# Patient Record
Sex: MALE | Race: WHITE | NOT HISPANIC OR LATINO | Employment: OTHER | URBAN - NONMETROPOLITAN AREA
[De-identification: names, ages, dates, MRNs, and addresses within clinical notes are randomized per-mention and may not be internally consistent; named-entity substitution may affect disease eponyms.]

---

## 2021-10-02 ENCOUNTER — HOSPITAL ENCOUNTER (EMERGENCY)
Facility: HOSPITAL | Age: 76
Discharge: HOME | End: 2021-10-02
Attending: EMERGENCY MEDICINE | Admitting: EMERGENCY MEDICINE

## 2021-10-02 VITALS
SYSTOLIC BLOOD PRESSURE: 144 MMHG | RESPIRATION RATE: 18 BRPM | DIASTOLIC BLOOD PRESSURE: 89 MMHG | BODY MASS INDEX: 36.37 KG/M2 | OXYGEN SATURATION: 96 % | HEART RATE: 80 BPM | TEMPERATURE: 98.6 F | HEIGHT: 68 IN | WEIGHT: 240 LBS

## 2021-10-02 DIAGNOSIS — U07.1 COVID-19: Primary | ICD-10-CM

## 2021-10-02 LAB
ALBUMIN SERPL-MCNC: 3.61 G/DL (ref 3.5–5.2)
ALBUMIN/GLOB SERPL: 1.1 G/DL
ALP SERPL-CCNC: 55 U/L (ref 39–117)
ALT SERPL W P-5'-P-CCNC: 22 U/L (ref 1–41)
ANION GAP SERPL CALCULATED.3IONS-SCNC: 10.6 MMOL/L (ref 5–15)
AST SERPL-CCNC: 31 U/L (ref 1–40)
BACTERIA UR QL AUTO: ABNORMAL /HPF
BASOPHILS # BLD AUTO: 0.01 10*3/MM3 (ref 0–0.2)
BASOPHILS NFR BLD AUTO: 0.3 % (ref 0–1.5)
BILIRUB SERPL-MCNC: 0.4 MG/DL (ref 0–1.2)
BILIRUB UR QL STRIP: NEGATIVE
BUN SERPL-MCNC: 16 MG/DL (ref 8–23)
BUN/CREAT SERPL: 11.2 (ref 7–25)
CALCIUM SPEC-SCNC: 8.1 MG/DL (ref 8.6–10.5)
CHLORIDE SERPL-SCNC: 101 MMOL/L (ref 98–107)
CLARITY UR: CLEAR
CO2 SERPL-SCNC: 22.4 MMOL/L (ref 22–29)
COLOR UR: ABNORMAL
CREAT SERPL-MCNC: 1.43 MG/DL (ref 0.76–1.27)
DEPRECATED RDW RBC AUTO: 43.5 FL (ref 37–54)
EOSINOPHIL # BLD AUTO: 0.04 10*3/MM3 (ref 0–0.4)
EOSINOPHIL NFR BLD AUTO: 1.1 % (ref 0.3–6.2)
ERYTHROCYTE [DISTWIDTH] IN BLOOD BY AUTOMATED COUNT: 12.8 % (ref 12.3–15.4)
FLUAV RNA RESP QL NAA+PROBE: NOT DETECTED
FLUBV RNA RESP QL NAA+PROBE: NOT DETECTED
GFR SERPL CREATININE-BSD FRML MDRD: 48 ML/MIN/1.73
GLOBULIN UR ELPH-MCNC: 3.4 GM/DL
GLUCOSE SERPL-MCNC: 90 MG/DL (ref 65–99)
GLUCOSE UR STRIP-MCNC: NEGATIVE MG/DL
HCT VFR BLD AUTO: 43.1 % (ref 37.5–51)
HGB BLD-MCNC: 14.1 G/DL (ref 13–17.7)
HGB UR QL STRIP.AUTO: NEGATIVE
HOLD SPECIMEN: NORMAL
HOLD SPECIMEN: NORMAL
HYALINE CASTS UR QL AUTO: ABNORMAL /LPF
IMM GRANULOCYTES # BLD AUTO: 0.01 10*3/MM3 (ref 0–0.05)
IMM GRANULOCYTES NFR BLD AUTO: 0.3 % (ref 0–0.5)
KETONES UR QL STRIP: ABNORMAL
LEUKOCYTE ESTERASE UR QL STRIP.AUTO: NEGATIVE
LYMPHOCYTES # BLD AUTO: 0.73 10*3/MM3 (ref 0.7–3.1)
LYMPHOCYTES NFR BLD AUTO: 20.3 % (ref 19.6–45.3)
MCH RBC QN AUTO: 30.1 PG (ref 26.6–33)
MCHC RBC AUTO-ENTMCNC: 32.7 G/DL (ref 31.5–35.7)
MCV RBC AUTO: 92.1 FL (ref 79–97)
MONOCYTES # BLD AUTO: 0.56 10*3/MM3 (ref 0.1–0.9)
MONOCYTES NFR BLD AUTO: 15.6 % (ref 5–12)
NEUTROPHILS NFR BLD AUTO: 2.25 10*3/MM3 (ref 1.7–7)
NEUTROPHILS NFR BLD AUTO: 62.4 % (ref 42.7–76)
NITRITE UR QL STRIP: NEGATIVE
NRBC BLD AUTO-RTO: 0 /100 WBC (ref 0–0.2)
PH UR STRIP.AUTO: <=5 [PH] (ref 5–8)
PLATELET # BLD AUTO: 96 10*3/MM3 (ref 140–450)
PMV BLD AUTO: 11.5 FL (ref 6–12)
POTASSIUM SERPL-SCNC: 4.1 MMOL/L (ref 3.5–5.2)
PROT SERPL-MCNC: 7 G/DL (ref 6–8.5)
PROT UR QL STRIP: ABNORMAL
RBC # BLD AUTO: 4.68 10*6/MM3 (ref 4.14–5.8)
RBC # UR: ABNORMAL /HPF
REF LAB TEST METHOD: ABNORMAL
SARS-COV-2 RNA RESP QL NAA+PROBE: DETECTED
SODIUM SERPL-SCNC: 134 MMOL/L (ref 136–145)
SP GR UR STRIP: >=1.03 (ref 1–1.03)
SQUAMOUS #/AREA URNS HPF: ABNORMAL /HPF
UROBILINOGEN UR QL STRIP: ABNORMAL
WBC # BLD AUTO: 3.6 10*3/MM3 (ref 3.4–10.8)
WBC UR QL AUTO: ABNORMAL /HPF
WHOLE BLOOD HOLD SPECIMEN: NORMAL
WHOLE BLOOD HOLD SPECIMEN: NORMAL

## 2021-10-02 PROCEDURE — 99284 EMERGENCY DEPT VISIT MOD MDM: CPT

## 2021-10-02 PROCEDURE — 81001 URINALYSIS AUTO W/SCOPE: CPT | Performed by: PHYSICIAN ASSISTANT

## 2021-10-02 PROCEDURE — M0243 CASIRIVI AND IMDEVI INFUSION: HCPCS

## 2021-10-02 PROCEDURE — 80053 COMPREHEN METABOLIC PANEL: CPT | Performed by: PHYSICIAN ASSISTANT

## 2021-10-02 PROCEDURE — 85025 COMPLETE CBC W/AUTO DIFF WBC: CPT | Performed by: PHYSICIAN ASSISTANT

## 2021-10-02 PROCEDURE — 87636 SARSCOV2 & INF A&B AMP PRB: CPT | Performed by: PHYSICIAN ASSISTANT

## 2021-10-02 PROCEDURE — C9803 HOPD COVID-19 SPEC COLLECT: HCPCS

## 2021-10-02 RX ORDER — METHYLPREDNISOLONE SODIUM SUCCINATE 125 MG/2ML
125 INJECTION, POWDER, LYOPHILIZED, FOR SOLUTION INTRAMUSCULAR; INTRAVENOUS ONCE AS NEEDED
Status: DISCONTINUED | OUTPATIENT
Start: 2021-10-02 | End: 2021-10-02 | Stop reason: HOSPADM

## 2021-10-02 RX ORDER — DIPHENHYDRAMINE HYDROCHLORIDE 50 MG/ML
50 INJECTION INTRAMUSCULAR; INTRAVENOUS ONCE AS NEEDED
Status: DISCONTINUED | OUTPATIENT
Start: 2021-10-02 | End: 2021-10-02 | Stop reason: HOSPADM

## 2021-10-02 RX ORDER — EPINEPHRINE 1 MG/ML
0.3 INJECTION, SOLUTION INTRAMUSCULAR; SUBCUTANEOUS ONCE AS NEEDED
Status: DISCONTINUED | OUTPATIENT
Start: 2021-10-02 | End: 2021-10-02 | Stop reason: HOSPADM

## 2021-10-02 RX ORDER — DIPHENHYDRAMINE HCL 50 MG
50 CAPSULE ORAL ONCE AS NEEDED
Status: DISCONTINUED | OUTPATIENT
Start: 2021-10-02 | End: 2021-10-02 | Stop reason: HOSPADM

## 2021-10-02 RX ORDER — SODIUM CHLORIDE 9 MG/ML
30 INJECTION, SOLUTION INTRAVENOUS ONCE
Status: COMPLETED | OUTPATIENT
Start: 2021-10-02 | End: 2021-10-02

## 2021-10-02 RX ORDER — SODIUM CHLORIDE 0.9 % (FLUSH) 0.9 %
10 SYRINGE (ML) INJECTION AS NEEDED
Status: DISCONTINUED | OUTPATIENT
Start: 2021-10-02 | End: 2021-10-02 | Stop reason: HOSPADM

## 2021-10-02 RX ADMIN — SODIUM CHLORIDE 30 ML: 9 INJECTION, SOLUTION INTRAVENOUS at 19:44

## 2021-10-02 RX ADMIN — SODIUM CHLORIDE 1000 ML: 9 INJECTION, SOLUTION INTRAVENOUS at 19:54

## 2021-10-02 RX ADMIN — CASIRIVIMAB AND IMDEVIMAB: 600; 600 INJECTION, SOLUTION, CONCENTRATE INTRAVENOUS at 19:23

## 2021-10-02 NOTE — ED PROVIDER NOTES
Subjective     History provided by:  Patient   used: No    URI  Presenting symptoms: congestion, cough, fatigue and rhinorrhea    Severity:  Mild  Onset quality:  Sudden  Duration:  5 days  Timing:  Constant  Progression:  Worsening  Chronicity:  New  Relieved by:  Nothing  Worsened by:  Nothing  Ineffective treatments:  None tried  Risk factors: sick contacts    Risk factors comment:  Wife and brother in law are covid +      Review of Systems   Constitutional: Positive for fatigue.   HENT: Positive for congestion and rhinorrhea.    Eyes: Negative.    Respiratory: Positive for cough.    Cardiovascular: Negative.    Gastrointestinal: Negative.    Endocrine: Negative.    Genitourinary: Negative.    Musculoskeletal: Negative.    Skin: Negative.    Allergic/Immunologic: Negative.    Neurological: Negative.    Hematological: Negative.    Psychiatric/Behavioral: Negative.    All other systems reviewed and are negative.      No past medical history on file.    Allergies   Allergen Reactions   • Ciprofibrate Hives   • Sulfa Antibiotics Hives       No past surgical history on file.    No family history on file.    Social History     Socioeconomic History   • Marital status:      Spouse name: Not on file   • Number of children: Not on file   • Years of education: Not on file   • Highest education level: Not on file           Objective   Physical Exam  Vitals and nursing note reviewed.   Constitutional:       Appearance: Normal appearance. He is normal weight.   HENT:      Head: Normocephalic and atraumatic.      Right Ear: External ear normal.      Left Ear: External ear normal.      Nose: Nose normal.      Mouth/Throat:      Mouth: Mucous membranes are moist.      Pharynx: Oropharynx is clear.   Eyes:      Extraocular Movements: Extraocular movements intact.      Conjunctiva/sclera: Conjunctivae normal.      Pupils: Pupils are equal, round, and reactive to light.   Cardiovascular:      Rate and  "Rhythm: Normal rate and regular rhythm.      Heart sounds: Normal heart sounds.   Pulmonary:      Effort: Pulmonary effort is normal.      Breath sounds: Normal breath sounds.   Abdominal:      General: Abdomen is flat. Bowel sounds are normal.      Palpations: Abdomen is soft.   Musculoskeletal:         General: Normal range of motion.      Cervical back: Normal range of motion and neck supple.   Skin:     General: Skin is warm and dry.      Capillary Refill: Capillary refill takes less than 2 seconds.   Neurological:      General: No focal deficit present.      Mental Status: He is alert and oriented to person, place, and time. Mental status is at baseline.   Psychiatric:         Mood and Affect: Mood normal.         Behavior: Behavior normal.         Judgment: Judgment normal.         Procedures           ED Course  ED Course as of Oct 02 2034   Sat Oct 02, 2021   1800 COVID19(!!): Detected [ML]   1838 The FDA has authorized the emergency use of REGN-COV2  which is not an FDA approved drug. Discussions with the pt regarding the risks and benefits of REGN-COV2 have occurred. The ptrecognizes that this is an investigational treatment which may offer significant known and potential benefits and risks, the extent of which are unknown. Information on available alternative treatments and the risks and benefits of those alternatives was discussed. The pt received the \"Fact Sheet for Patients Parents and Caregivers\". All questions from the pt were answered to satisfaction. The pt has the option to accept or refuse treatment with REGN-COV2 and would like to accept treatment.    Counseling regarding continued self isolation and use of infection control measures according to the CDC guidelines has occurred.      [ML]      ED Course User Index  [ML] Akua Martin PA                                           MDM  Number of Diagnoses or Management Options     Amount and/or Complexity of Data Reviewed  Clinical lab " tests: ordered and reviewed  Tests in the radiology section of CPT®: reviewed and ordered    Risk of Complications, Morbidity, and/or Mortality  Presenting problems: low  Diagnostic procedures: low  Management options: low        Final diagnoses:   COVID-19       ED Disposition  ED Disposition     ED Disposition Condition Comment    Discharge Stable           No follow-up provider specified.       Medication List      No changes were made to your prescriptions during this visit.          Akua Martin PA  10/02/21 1903       Akua Martin PA  10/02/21 2033

## 2021-10-02 NOTE — ED NOTES
MD has discussed The FDA has authorized the emergency use of REGN-COV2  , which is not an FDA approved drug. Discussions with the patient regarding the risks and benefits of  REGN-COV2  have occurred. The patient recognizes that this is an investigational treatment which may offer significant known and potential benefits and risks, the extent of which are unknown. Information on available alternative treatments and the risks and benefits of those alternatives was discussed. The patient received the “Fact Sheet for Patients Parents and Caregivers”. All questions from the patient were answered to satisfaction. The patient has the option to accept or refuse treatment with REGN-COV2 and would like to accept treatment.  Education handouts have been given to patient.    Counseling regarding continued self isolation and use of infection control measures according to the CDC guidelines has occurred.       Sumi Martinez RN  10/02/21 4408

## 2021-10-02 NOTE — DISCHARGE INSTRUCTIONS
Call one of the offices below to establish a primary care provider.  If you are unable to get an appointment and feel it is an emergency and need to be seen immediately please return to the Emergency Department.    Call one of the office below to set up a primary care provider.    Dr. Dimitris Andino                                                                                                       [address] [facility] [address]  [phone]    Dr. Jones, Dr. JAMMIE Sanford, Dr. ROSALES Sanford ([facility])  [address] [facility] [address]  [phone]    Dr. Stovall, Dr. Zamora, Dr. Nava ([facility])  [address] [facility] [address]  [phone]    Dr. Barker  [address] [facility] [address]  [phone]    Dr. Ward, Dr. Phillips, Dr. Fay, Dr. Andrew (Critical access hospital)  [address] [place] [address] [address]  [facility] [address]  [phone]    Dr. Anushka Lopez  [address] [facility] [place] [address]  [phone]    Dr. Ping Flor  [address]   [address]  [facility] [address]  [phone]    Dr. Andino  [address] [facility] [address]  [phone]    Dr. Mack, Dr. More  [address] [facility] [place] [address]  [phone]    Dr. Mohr  [address]                                                              [address]  [facility] [address]  [phone]    Dr. Patel  [address] [facility] [address]  [phone]    Dr. Georgia Polo  [address]  [address]  [place] [address]  [phone]    Dr. Zhao and [facility]   [address] [facility]  [address]  [place] [address]  [phone]

## 2021-10-03 ENCOUNTER — APPOINTMENT (OUTPATIENT)
Dept: GENERAL RADIOLOGY | Facility: HOSPITAL | Age: 76
End: 2021-10-03

## 2021-10-03 ENCOUNTER — HOSPITAL ENCOUNTER (INPATIENT)
Facility: HOSPITAL | Age: 76
LOS: 2 days | Discharge: HOME | End: 2021-10-05
Attending: STUDENT IN AN ORGANIZED HEALTH CARE EDUCATION/TRAINING PROGRAM | Admitting: HOSPITALIST

## 2021-10-03 ENCOUNTER — APPOINTMENT (OUTPATIENT)
Dept: CT IMAGING | Facility: HOSPITAL | Age: 76
End: 2021-10-03

## 2021-10-03 DIAGNOSIS — U07.1 COVID-19: Primary | ICD-10-CM

## 2021-10-03 PROBLEM — J12.82 PNEUMONIA DUE TO COVID-19 VIRUS: Status: ACTIVE | Noted: 2021-10-03

## 2021-10-03 LAB
A-A DO2: 44.1 MMHG (ref 0–300)
A-A DO2: 75.7 MMHG (ref 0–300)
ALBUMIN SERPL-MCNC: 3.87 G/DL (ref 3.5–5.2)
ALBUMIN/GLOB SERPL: 1.3 G/DL
ALP SERPL-CCNC: 53 U/L (ref 39–117)
ALT SERPL W P-5'-P-CCNC: 30 U/L (ref 1–41)
ANION GAP SERPL CALCULATED.3IONS-SCNC: 11 MMOL/L (ref 5–15)
APTT PPP: 31.1 SECONDS (ref 25.5–35.4)
ARTERIAL PATENCY WRIST A: ABNORMAL
ARTERIAL PATENCY WRIST A: POSITIVE
AST SERPL-CCNC: 35 U/L (ref 1–40)
ATMOSPHERIC PRESS: 727 MMHG
ATMOSPHERIC PRESS: 728 MMHG
BASE EXCESS BLDA CALC-SCNC: 1.7 MMOL/L (ref 0–2)
BASE EXCESS BLDA CALC-SCNC: 2.4 MMOL/L (ref 0–2)
BASOPHILS # BLD AUTO: 0 10*3/MM3 (ref 0–0.2)
BASOPHILS # BLD AUTO: 0.01 10*3/MM3 (ref 0–0.2)
BASOPHILS NFR BLD AUTO: 0 % (ref 0–1.5)
BASOPHILS NFR BLD AUTO: 0.2 % (ref 0–1.5)
BDY SITE: ABNORMAL
BDY SITE: ABNORMAL
BILIRUB SERPL-MCNC: 0.5 MG/DL (ref 0–1.2)
BODY TEMPERATURE: 0 C
BODY TEMPERATURE: 0 C
BUN SERPL-MCNC: 17 MG/DL (ref 8–23)
BUN/CREAT SERPL: 13.1 (ref 7–25)
CALCIUM SPEC-SCNC: 8.2 MG/DL (ref 8.6–10.5)
CHLORIDE SERPL-SCNC: 99 MMOL/L (ref 98–107)
CO2 BLDA-SCNC: 27.2 MMOL/L (ref 22–33)
CO2 BLDA-SCNC: 27.6 MMOL/L (ref 22–33)
CO2 SERPL-SCNC: 26 MMOL/L (ref 22–29)
COHGB MFR BLD: 0.8 % (ref 0–5)
COHGB MFR BLD: 1 % (ref 0–5)
CREAT SERPL-MCNC: 1.3 MG/DL (ref 0.76–1.27)
CRP SERPL-MCNC: 2.04 MG/DL (ref 0–0.5)
D DIMER PPP FEU-MCNC: 0.36 MCGFEU/ML (ref 0–0.5)
DEPRECATED RDW RBC AUTO: 42.8 FL (ref 37–54)
DEPRECATED RDW RBC AUTO: 43 FL (ref 37–54)
EOSINOPHIL # BLD AUTO: 0 10*3/MM3 (ref 0–0.4)
EOSINOPHIL # BLD AUTO: 0 10*3/MM3 (ref 0–0.4)
EOSINOPHIL NFR BLD AUTO: 0 % (ref 0.3–6.2)
EOSINOPHIL NFR BLD AUTO: 0 % (ref 0.3–6.2)
ERYTHROCYTE [DISTWIDTH] IN BLOOD BY AUTOMATED COUNT: 12.7 % (ref 12.3–15.4)
ERYTHROCYTE [DISTWIDTH] IN BLOOD BY AUTOMATED COUNT: 12.8 % (ref 12.3–15.4)
FERRITIN SERPL-MCNC: 399.5 NG/ML (ref 30–400)
FOLATE SERPL-MCNC: 17.3 NG/ML (ref 4.78–24.2)
GAS FLOW AIRWAY: 2 LPM
GFR SERPL CREATININE-BSD FRML MDRD: 54 ML/MIN/1.73
GLOBULIN UR ELPH-MCNC: 3 GM/DL
GLUCOSE BLDC GLUCOMTR-MCNC: 182 MG/DL (ref 70–130)
GLUCOSE SERPL-MCNC: 152 MG/DL (ref 65–99)
HAV IGM SERPL QL IA: NORMAL
HBA1C MFR BLD: 5.9 % (ref 4.8–5.6)
HBV CORE IGM SERPL QL IA: NORMAL
HBV SURFACE AG SERPL QL IA: NORMAL
HCO3 BLDA-SCNC: 26 MMOL/L (ref 20–26)
HCO3 BLDA-SCNC: 26.4 MMOL/L (ref 20–26)
HCT VFR BLD AUTO: 41.2 % (ref 37.5–51)
HCT VFR BLD AUTO: 42 % (ref 37.5–51)
HCT VFR BLD CALC: 40.6 % (ref 38–51)
HCT VFR BLD CALC: 42.8 % (ref 38–51)
HCV AB SER DONR QL: NORMAL
HGB BLD-MCNC: 13.6 G/DL (ref 13–17.7)
HGB BLD-MCNC: 13.9 G/DL (ref 13–17.7)
HGB BLDA-MCNC: 13.2 G/DL (ref 14–18)
HGB BLDA-MCNC: 14 G/DL (ref 14–18)
HIV1+2 AB SER QL: NORMAL
IMM GRANULOCYTES # BLD AUTO: 0.01 10*3/MM3 (ref 0–0.05)
IMM GRANULOCYTES # BLD AUTO: 0.01 10*3/MM3 (ref 0–0.05)
IMM GRANULOCYTES NFR BLD AUTO: 0.2 % (ref 0–0.5)
IMM GRANULOCYTES NFR BLD AUTO: 0.2 % (ref 0–0.5)
INHALED O2 CONCENTRATION: 21 %
INHALED O2 CONCENTRATION: 28 %
INR PPP: 1.03 (ref 0.9–1.1)
LARGE PLATELETS: NORMAL
LDH SERPL-CCNC: 216 U/L (ref 135–225)
LYMPHOCYTES # BLD AUTO: 0.45 10*3/MM3 (ref 0.7–3.1)
LYMPHOCYTES # BLD AUTO: 0.51 10*3/MM3 (ref 0.7–3.1)
LYMPHOCYTES NFR BLD AUTO: 9.1 % (ref 19.6–45.3)
LYMPHOCYTES NFR BLD AUTO: 9.5 % (ref 19.6–45.3)
Lab: ABNORMAL
Lab: ABNORMAL
MAGNESIUM SERPL-MCNC: 1.9 MG/DL (ref 1.6–2.4)
MCH RBC QN AUTO: 30.2 PG (ref 26.6–33)
MCH RBC QN AUTO: 30.3 PG (ref 26.6–33)
MCHC RBC AUTO-ENTMCNC: 33 G/DL (ref 31.5–35.7)
MCHC RBC AUTO-ENTMCNC: 33.1 G/DL (ref 31.5–35.7)
MCV RBC AUTO: 91.4 FL (ref 79–97)
MCV RBC AUTO: 91.7 FL (ref 79–97)
METHGB BLD QL: 0.2 % (ref 0–3)
METHGB BLD QL: 0.2 % (ref 0–3)
MODALITY: ABNORMAL
MODALITY: ABNORMAL
MONOCYTES # BLD AUTO: 0.16 10*3/MM3 (ref 0.1–0.9)
MONOCYTES # BLD AUTO: 0.59 10*3/MM3 (ref 0.1–0.9)
MONOCYTES NFR BLD AUTO: 11 % (ref 5–12)
MONOCYTES NFR BLD AUTO: 3.2 % (ref 5–12)
NEUTROPHILS NFR BLD AUTO: 4.25 10*3/MM3 (ref 1.7–7)
NEUTROPHILS NFR BLD AUTO: 4.34 10*3/MM3 (ref 1.7–7)
NEUTROPHILS NFR BLD AUTO: 79.1 % (ref 42.7–76)
NEUTROPHILS NFR BLD AUTO: 87.5 % (ref 42.7–76)
NOTE: ABNORMAL
NOTE: ABNORMAL
NOTIFIED BY: ABNORMAL
NOTIFIED WHO: ABNORMAL
NRBC BLD AUTO-RTO: 0 /100 WBC (ref 0–0.2)
NRBC BLD AUTO-RTO: 0 /100 WBC (ref 0–0.2)
OXYHGB MFR BLDV: 88.6 % (ref 94–99)
OXYHGB MFR BLDV: 94.5 % (ref 94–99)
PCO2 BLDA: 37.8 MM HG (ref 35–45)
PCO2 BLDA: 38.9 MM HG (ref 35–45)
PCO2 TEMP ADJ BLD: ABNORMAL MM[HG]
PCO2 TEMP ADJ BLD: ABNORMAL MM[HG]
PH BLDA: 7.43 PH UNITS (ref 7.35–7.45)
PH BLDA: 7.45 PH UNITS (ref 7.35–7.45)
PH, TEMP CORRECTED: ABNORMAL
PH, TEMP CORRECTED: ABNORMAL
PLATELET # BLD AUTO: 84 10*3/MM3 (ref 140–450)
PLATELET # BLD AUTO: 88 10*3/MM3 (ref 140–450)
PMV BLD AUTO: 11 FL (ref 6–12)
PMV BLD AUTO: 12.5 FL (ref 6–12)
PO2 BLDA: 55 MM HG (ref 83–108)
PO2 BLDA: 72.5 MM HG (ref 83–108)
PO2 TEMP ADJ BLD: ABNORMAL MM[HG]
PO2 TEMP ADJ BLD: ABNORMAL MM[HG]
POTASSIUM SERPL-SCNC: 3.9 MMOL/L (ref 3.5–5.2)
PROCALCITONIN SERPL-MCNC: 0.13 NG/ML (ref 0–0.25)
PROT SERPL-MCNC: 6.9 G/DL (ref 6–8.5)
PROTHROMBIN TIME: 13.9 SECONDS (ref 12.8–14.5)
QT INTERVAL: 338 MS
QTC INTERVAL: 431 MS
RBC # BLD AUTO: 4.51 10*6/MM3 (ref 4.14–5.8)
RBC # BLD AUTO: 4.58 10*6/MM3 (ref 4.14–5.8)
RBC MORPH BLD: NORMAL
SAO2 % BLDCOA: 89.7 % (ref 94–99)
SAO2 % BLDCOA: 95.5 % (ref 94–99)
SMALL PLATELETS BLD QL SMEAR: NORMAL
SODIUM SERPL-SCNC: 136 MMOL/L (ref 136–145)
TSH SERPL DL<=0.05 MIU/L-ACNC: 0.52 UIU/ML (ref 0.27–4.2)
VENTILATOR MODE: ABNORMAL
VENTILATOR MODE: ABNORMAL
VIT B12 BLD-MCNC: 452 PG/ML (ref 211–946)
WBC # BLD AUTO: 4.96 10*3/MM3 (ref 3.4–10.8)
WBC # BLD AUTO: 5.37 10*3/MM3 (ref 3.4–10.8)

## 2021-10-03 PROCEDURE — 85610 PROTHROMBIN TIME: CPT | Performed by: STUDENT IN AN ORGANIZED HEALTH CARE EDUCATION/TRAINING PROGRAM

## 2021-10-03 PROCEDURE — 83615 LACTATE (LD) (LDH) ENZYME: CPT | Performed by: STUDENT IN AN ORGANIZED HEALTH CARE EDUCATION/TRAINING PROGRAM

## 2021-10-03 PROCEDURE — 94799 UNLISTED PULMONARY SVC/PX: CPT

## 2021-10-03 PROCEDURE — 82728 ASSAY OF FERRITIN: CPT | Performed by: STUDENT IN AN ORGANIZED HEALTH CARE EDUCATION/TRAINING PROGRAM

## 2021-10-03 PROCEDURE — 80074 ACUTE HEPATITIS PANEL: CPT | Performed by: INTERNAL MEDICINE

## 2021-10-03 PROCEDURE — 85060 BLOOD SMEAR INTERPRETATION: CPT | Performed by: NURSE PRACTITIONER

## 2021-10-03 PROCEDURE — 80053 COMPREHEN METABOLIC PANEL: CPT | Performed by: STUDENT IN AN ORGANIZED HEALTH CARE EDUCATION/TRAINING PROGRAM

## 2021-10-03 PROCEDURE — 84145 PROCALCITONIN (PCT): CPT | Performed by: HOSPITALIST

## 2021-10-03 PROCEDURE — 99284 EMERGENCY DEPT VISIT MOD MDM: CPT

## 2021-10-03 PROCEDURE — 25010000002 DEXAMETHASONE PER 1 MG: Performed by: NURSE PRACTITIONER

## 2021-10-03 PROCEDURE — 85025 COMPLETE CBC W/AUTO DIFF WBC: CPT | Performed by: STUDENT IN AN ORGANIZED HEALTH CARE EDUCATION/TRAINING PROGRAM

## 2021-10-03 PROCEDURE — 71046 X-RAY EXAM CHEST 2 VIEWS: CPT

## 2021-10-03 PROCEDURE — 83036 HEMOGLOBIN GLYCOSYLATED A1C: CPT | Performed by: NURSE PRACTITIONER

## 2021-10-03 PROCEDURE — 82607 VITAMIN B-12: CPT | Performed by: INTERNAL MEDICINE

## 2021-10-03 PROCEDURE — 85379 FIBRIN DEGRADATION QUANT: CPT | Performed by: STUDENT IN AN ORGANIZED HEALTH CARE EDUCATION/TRAINING PROGRAM

## 2021-10-03 PROCEDURE — XW033E5 INTRODUCTION OF REMDESIVIR ANTI-INFECTIVE INTO PERIPHERAL VEIN, PERCUTANEOUS APPROACH, NEW TECHNOLOGY GROUP 5: ICD-10-PCS | Performed by: INTERNAL MEDICINE

## 2021-10-03 PROCEDURE — 84443 ASSAY THYROID STIM HORMONE: CPT | Performed by: INTERNAL MEDICINE

## 2021-10-03 PROCEDURE — 83050 HGB METHEMOGLOBIN QUAN: CPT

## 2021-10-03 PROCEDURE — 82375 ASSAY CARBOXYHB QUANT: CPT

## 2021-10-03 PROCEDURE — 82746 ASSAY OF FOLIC ACID SERUM: CPT | Performed by: INTERNAL MEDICINE

## 2021-10-03 PROCEDURE — 82962 GLUCOSE BLOOD TEST: CPT

## 2021-10-03 PROCEDURE — 93005 ELECTROCARDIOGRAM TRACING: CPT | Performed by: STUDENT IN AN ORGANIZED HEALTH CARE EDUCATION/TRAINING PROGRAM

## 2021-10-03 PROCEDURE — G0432 EIA HIV-1/HIV-2 SCREEN: HCPCS | Performed by: INTERNAL MEDICINE

## 2021-10-03 PROCEDURE — 93010 ELECTROCARDIOGRAM REPORT: CPT | Performed by: INTERNAL MEDICINE

## 2021-10-03 PROCEDURE — 85025 COMPLETE CBC W/AUTO DIFF WBC: CPT | Performed by: NURSE PRACTITIONER

## 2021-10-03 PROCEDURE — 99223 1ST HOSP IP/OBS HIGH 75: CPT | Performed by: NURSE PRACTITIONER

## 2021-10-03 PROCEDURE — 87040 BLOOD CULTURE FOR BACTERIA: CPT | Performed by: HOSPITALIST

## 2021-10-03 PROCEDURE — 85730 THROMBOPLASTIN TIME PARTIAL: CPT | Performed by: STUDENT IN AN ORGANIZED HEALTH CARE EDUCATION/TRAINING PROGRAM

## 2021-10-03 PROCEDURE — 86140 C-REACTIVE PROTEIN: CPT | Performed by: NURSE PRACTITIONER

## 2021-10-03 PROCEDURE — 83735 ASSAY OF MAGNESIUM: CPT | Performed by: STUDENT IN AN ORGANIZED HEALTH CARE EDUCATION/TRAINING PROGRAM

## 2021-10-03 PROCEDURE — 36600 WITHDRAWAL OF ARTERIAL BLOOD: CPT

## 2021-10-03 PROCEDURE — 82805 BLOOD GASES W/O2 SATURATION: CPT

## 2021-10-03 PROCEDURE — 85007 BL SMEAR W/DIFF WBC COUNT: CPT | Performed by: NURSE PRACTITIONER

## 2021-10-03 PROCEDURE — 70450 CT HEAD/BRAIN W/O DYE: CPT

## 2021-10-03 RX ORDER — PANTOPRAZOLE SODIUM 40 MG/1
40 TABLET, DELAYED RELEASE ORAL DAILY
COMMUNITY

## 2021-10-03 RX ORDER — SODIUM CHLORIDE 0.9 % (FLUSH) 0.9 %
10 SYRINGE (ML) INJECTION EVERY 12 HOURS SCHEDULED
Status: DISCONTINUED | OUTPATIENT
Start: 2021-10-03 | End: 2021-10-05 | Stop reason: HOSPADM

## 2021-10-03 RX ORDER — SODIUM CHLORIDE 0.9 % (FLUSH) 0.9 %
10 SYRINGE (ML) INJECTION AS NEEDED
Status: DISCONTINUED | OUTPATIENT
Start: 2021-10-03 | End: 2021-10-05 | Stop reason: HOSPADM

## 2021-10-03 RX ORDER — NICOTINE POLACRILEX 4 MG
15 LOZENGE BUCCAL
Status: DISCONTINUED | OUTPATIENT
Start: 2021-10-03 | End: 2021-10-05 | Stop reason: HOSPADM

## 2021-10-03 RX ORDER — ZINC SULFATE 50(220)MG
1 CAPSULE ORAL DAILY
Status: DISCONTINUED | OUTPATIENT
Start: 2021-10-03 | End: 2021-10-05 | Stop reason: HOSPADM

## 2021-10-03 RX ORDER — GUAIFENESIN 600 MG/1
1200 TABLET, EXTENDED RELEASE ORAL 2 TIMES DAILY
COMMUNITY

## 2021-10-03 RX ORDER — PANTOPRAZOLE SODIUM 40 MG/1
40 TABLET, DELAYED RELEASE ORAL DAILY
Status: DISCONTINUED | OUTPATIENT
Start: 2021-10-03 | End: 2021-10-05 | Stop reason: HOSPADM

## 2021-10-03 RX ORDER — AMLODIPINE BESYLATE 10 MG/1
10 TABLET ORAL NIGHTLY
Status: DISCONTINUED | OUTPATIENT
Start: 2021-10-03 | End: 2021-10-05 | Stop reason: HOSPADM

## 2021-10-03 RX ORDER — DEXTROSE MONOHYDRATE 25 G/50ML
25 INJECTION, SOLUTION INTRAVENOUS
Status: DISCONTINUED | OUTPATIENT
Start: 2021-10-03 | End: 2021-10-05 | Stop reason: HOSPADM

## 2021-10-03 RX ORDER — AMLODIPINE BESYLATE 10 MG/1
10 TABLET ORAL NIGHTLY
COMMUNITY

## 2021-10-03 RX ORDER — DEXAMETHASONE SODIUM PHOSPHATE 4 MG/ML
6 INJECTION, SOLUTION INTRA-ARTICULAR; INTRALESIONAL; INTRAMUSCULAR; INTRAVENOUS; SOFT TISSUE DAILY
Status: DISCONTINUED | OUTPATIENT
Start: 2021-10-04 | End: 2021-10-05 | Stop reason: HOSPADM

## 2021-10-03 RX ORDER — TERAZOSIN 5 MG/1
5 CAPSULE ORAL
Status: CANCELLED | OUTPATIENT
Start: 2021-10-03

## 2021-10-03 RX ORDER — GUAIFENESIN 600 MG/1
1200 TABLET, EXTENDED RELEASE ORAL EVERY 12 HOURS SCHEDULED
Status: DISCONTINUED | OUTPATIENT
Start: 2021-10-03 | End: 2021-10-05 | Stop reason: HOSPADM

## 2021-10-03 RX ORDER — MELATONIN
1000 DAILY
COMMUNITY

## 2021-10-03 RX ORDER — TERAZOSIN 5 MG/1
5 CAPSULE ORAL EVERY 12 HOURS SCHEDULED
Status: DISCONTINUED | OUTPATIENT
Start: 2021-10-03 | End: 2021-10-05 | Stop reason: HOSPADM

## 2021-10-03 RX ORDER — DOXAZOSIN 8 MG/1
8 TABLET ORAL EVERY MORNING
COMMUNITY

## 2021-10-03 RX ORDER — DEXAMETHASONE SODIUM PHOSPHATE 10 MG/ML
10 INJECTION INTRAMUSCULAR; INTRAVENOUS ONCE
Status: COMPLETED | OUTPATIENT
Start: 2021-10-03 | End: 2021-10-03

## 2021-10-03 RX ORDER — ZINC SULFATE 50(220)MG
1 CAPSULE ORAL DAILY
COMMUNITY

## 2021-10-03 RX ADMIN — TERAZOSIN HYDROCHLORIDE 5 MG: 5 CAPSULE ORAL at 23:27

## 2021-10-03 RX ADMIN — GUAIFENESIN 1200 MG: 600 TABLET, EXTENDED RELEASE ORAL at 23:25

## 2021-10-03 RX ADMIN — AMLODIPINE BESYLATE 10 MG: 10 TABLET ORAL at 23:25

## 2021-10-03 RX ADMIN — TERAZOSIN HYDROCHLORIDE 5 MG: 5 CAPSULE ORAL at 13:17

## 2021-10-03 RX ADMIN — DEXAMETHASONE SODIUM PHOSPHATE 10 MG: 10 INJECTION INTRAMUSCULAR; INTRAVENOUS at 04:34

## 2021-10-03 RX ADMIN — PANTOPRAZOLE SODIUM 40 MG: 40 TABLET, DELAYED RELEASE ORAL at 13:17

## 2021-10-03 RX ADMIN — SODIUM CHLORIDE, PRESERVATIVE FREE 10 ML: 5 INJECTION INTRAVENOUS at 08:58

## 2021-10-03 RX ADMIN — GUAIFENESIN 1200 MG: 600 TABLET, EXTENDED RELEASE ORAL at 13:17

## 2021-10-03 RX ADMIN — REMDESIVIR 200 MG: 100 INJECTION, POWDER, LYOPHILIZED, FOR SOLUTION INTRAVENOUS at 10:30

## 2021-10-03 RX ADMIN — Medication 220 MG: at 13:17

## 2021-10-03 RX ADMIN — SODIUM CHLORIDE, PRESERVATIVE FREE 10 ML: 5 INJECTION INTRAVENOUS at 23:26

## 2021-10-03 NOTE — ED PROVIDER NOTES
Subjective   Patient is a 75 year old male presenting to the ER c/o SOA and cough. Patient is COVID-19 + and received the antibody infusion yesterday at this facility and after he was discharged and went home he became more and more SOA. Patient states he checked his O2 at home and it was 84%. Patient states that with any exertion he becomes more SOA. Patient denies any lung disease or oxygen therapy at home. Patient denies CP, fever, n/v/d/constipation or any additional symptoms today.       History provided by:  Patient   used: No        Review of Systems   Constitutional: Negative.    HENT: Negative.    Eyes: Negative.    Respiratory: Positive for cough and shortness of breath.    Cardiovascular: Negative.    Gastrointestinal: Negative.    Endocrine: Negative.    Genitourinary: Negative.    Musculoskeletal: Negative.    Skin: Negative.    Allergic/Immunologic: Negative.    Neurological: Negative.    Hematological: Negative.    Psychiatric/Behavioral: Negative.    All other systems reviewed and are negative.      Past Medical History:   Diagnosis Date   • Hypertension        Allergies   Allergen Reactions   • Ciprofibrate Hives   • Sulfa Antibiotics Hives       Past Surgical History:   Procedure Laterality Date   • APPENDECTOMY         History reviewed. No pertinent family history.    Social History     Socioeconomic History   • Marital status:    Tobacco Use   • Smoking status: Never Smoker   • Smokeless tobacco: Never Used   Substance and Sexual Activity   • Alcohol use: Never   • Drug use: Never   • Sexual activity: Defer           Objective   Physical Exam  Vitals and nursing note reviewed.   Constitutional:       Appearance: He is well-developed. He is obese.   HENT:      Head: Normocephalic and atraumatic.      Mouth/Throat:      Mouth: Mucous membranes are moist.      Pharynx: Oropharynx is clear.   Eyes:      Extraocular Movements: Extraocular movements intact.      Pupils:  Pupils are equal, round, and reactive to light.   Cardiovascular:      Rate and Rhythm: Regular rhythm. Tachycardia present.   Pulmonary:      Effort: Pulmonary effort is normal.      Breath sounds: Decreased breath sounds present.   Abdominal:      General: Bowel sounds are normal.      Palpations: Abdomen is soft.   Musculoskeletal:         General: Normal range of motion.      Cervical back: Normal range of motion and neck supple.   Skin:     General: Skin is warm.      Capillary Refill: Capillary refill takes less than 2 seconds.   Neurological:      General: No focal deficit present.      Mental Status: He is alert. He is disoriented.   Psychiatric:         Mood and Affect: Mood normal.         Behavior: Behavior normal.         Procedures           ED Course  ED Course as of 10/10/21 0056   Sun Oct 03, 2021   0424 EKG NSR 98 bpm, , QRS 90, QTc 431, regular axis, no ST deviation or T wave abnormalities concerning for acute ischemia.   []   0429 XR Chest 2 View  IMPRESSION:  Stable cardiac enlargement with low lung volumes. There are very mild infiltrates in the lung bases.     Signer Name: Yoel Oliveira MD   Signed: 10/3/2021 4:23 AM   Workstation Name: [facility]    [facility] []   7062 Paged Hospitalist at this time.  [SM]   3836 Discussed case with Dr. Cruz agreed to admit to hospitalist service.  [SM]      ED Course User Index  [KP] Jordan Goodwin MD  [] Liz Young, APRN                                           [facility]    Final diagnoses:   COVID-19       ED Disposition  ED Disposition     ED Disposition Condition Comment    Decision to Admit  Level of Care: Med/Surg [1]   Diagnosis: Pneumonia due to COVID-19 virus [[id number]]   Admitting Physician: JERALD CRUZ [[id number]]   Attending Physician: JERALD CRUZ [[id number]]   Certification: I Certify That Inpatient Hospital Services Are Medically Necessary For Greater Than 2 Midnights             Provider, No Known  [facility]  [place] [address]  [phone]    Follow up      Kreis, Samuel Duane, MD  [address] [place] [address] [address] [place] [address] [phone]    Follow up  Recommend CBC at next visit to monitor thrombocytopenia and heme/onc referral if it continues to worsen.          Medication List      No changes were made to your prescriptions during this visit.          Liz Young, APRN  10/10/21 0056

## 2021-10-03 NOTE — H&P
[facility] Medicine Services  History & Physical          Patient Identification:  Name:  Richardson Odom  Age:  75 y.o.  Sex:  male  :  1945  MRN:  [id number]   Admit Date: 10/3/2021   Visit Number:  [id number]  Primary Care Physician:  Provider, No Known    I have seen the patient in conjunction with SILAS Yang and I agree with the following statements:     Subjective     Chief complaint: low oxygen at home    History of presenting illness:    Mr. Odom is a 75 year old male patient who presented to [facility] ED on 10/3/21.  His family was positive on Wednesday, he was also having symptoms but did not get tested. He was seen in the ER yesterday on 10/2/21 due to his daughter wanting him to have the infusion (this was also his date of being positive by swab). On my exam he has a difficult time recalling even his birth date, the year and the city he is in, he is able to give me a medical history. I have called his wife, Reba, She states after being in the ER the first time on 10/2 he was acting different he wasn't driving good on the way home, and his wife made him let her drive home. His oxygen at home was 84% but she states her pulse ox is older, and at times would read into the 90's. He was a little confused at home before coming back to the ER. Their daughter persuaded him to come back to the ER for further evaluation.  He did vomit at home, he has not had any dyspnea, has had a slight cough. He has had the first shot of Pfizer was due for second shot tomorrow. His wife says he is lazy and doesn't want to walk. On my exam he is resting comfortably in his room on 2 LNC with Sp02 95-97%.     His treatment in the ER included Decadron 10mg IV x1. His v/s in the ED were temp 98.7, , RR 20, /77.     His past medical history is significant for HTN, Skin cancers removed. No history of strokes, seizures, blood clots, cancers, heart stents, irregular heart beat or  heart failure.He doesn't smoke, with no history of tobacco use, no drugs or alcohol.       ---------------------------------------------------------------------------------------------------------------------   Review of Systems   Constitutional: Positive for chills. Negative for diaphoresis, fatigue and fever.   HENT: Negative for congestion and rhinorrhea.    Respiratory: Positive for cough. Negative for shortness of breath.    Cardiovascular: Negative for chest pain and leg swelling.   Gastrointestinal: Negative for abdominal distention.   Genitourinary: Negative for difficulty urinating.   Musculoskeletal: Negative.  Negative for arthralgias.   Skin: Negative for color change.   Neurological: Negative for dizziness and weakness.   Psychiatric/Behavioral: Positive for confusion. Negative for agitation and behavioral problems.      ---------------------------------------------------------------------------------------------------------------------   Past Medical History:   Diagnosis Date   • Hypertension      Past Surgical History:   Procedure Laterality Date   • APPENDECTOMY       History reviewed. No pertinent family history.  Social History     Socioeconomic History   • Marital status:      Spouse name: Not on file   • Number of children: Not on file   • Years of education: Not on file   • Highest education level: Not on file   Tobacco Use   • Smoking status: Never Smoker   • Smokeless tobacco: Never Used   Substance and Sexual Activity   • Alcohol use: Never   • Drug use: Never   • Sexual activity: Defer     ---------------------------------------------------------------------------------------------------------------------   Allergies:  Ciprofibrate and Sulfa antibiotics  ---------------------------------------------------------------------------------------------------------------------     Medications below are reported home medications pulling from within the system; at this time, these medications  have not been reconciled unless otherwise specified and are in the verification process for further verifcation as current home medications.    Prior to Admission Medications     None        Hospital Scheduled Meds:       ---------------------------------------------------------------------------------------------------------------------   Objective       Vital Signs:  Temp:  [98.7 °F (37.1 °C)] 98.7 °F (37.1 °C)  Heart Rate:  [] 99  Resp:  [20] 20  BP: (130-181)/(56-81) 144/69      10/03/21  0137 10/03/21  0733   Weight: 109 kg (240 lb) 102 kg (225 lb)     Body mass index is 35.24 kg/m².  ---------------------------------------------------------------------------------------------------------------------       Physical Exam    Physical Exam:  Constitutional: male lying on stretcher bed on 2 LNC, Sp02 97%  HENT:  Head: Normocephalic and atraumatic.  Mouth:  Moist mucous membranes.    Eyes:   Pupils are equal, round, and reactive to light.  No scleral icterus.  Neck:  Neck supple.  No JVD present.    Cardiovascular:  Normal rate, regular rhythm.  with no murmur.  Pulmonary/Chest:  No respiratory distress on 2 LNC, no cough.  Abdominal:  Soft.  Bowel sounds are present.  No distension and no tenderness.   Musculoskeletal:  No edema, no tenderness, and no deformity.  No red or swollen joints anywhere.    Neurological:  Alert, on arrival when asked his birth date he was slow to be able to give this to me, he almost acted like he did not know it, he had a very difficult time telling me the current year, did not remember he was in [place] initially but later told me he was. Hand  and pedal pushes strong and equal.   No tongue deviation.  No facial droop.  No slurred speech.   Skin:  Skin is warm and dry.  No rash noted.  No pallor.   Peripheral vascular:  No edema and strong pulses on all 4  extremities.  ---------------------------------------------------------------------------------------------------------------------  EKG:         ---------------------------------------------------------------------------------------------------------------------   Results from last 7 days   Lab Units 10/03/21  0249 10/02/21  1804   CRP mg/dL 2.04*  --    WBC 10*3/mm3 5.37 3.60   HEMOGLOBIN g/dL 13.9 14.1   HEMATOCRIT % 42.0 43.1   MCV fL 91.7 92.1   MCHC g/dL 33.1 32.7   PLATELETS 10*3/mm3 88* 96*   INR  1.03  --      Results from last 7 days   Lab Units 10/03/21  0550   PH, ARTERIAL pH units 7.453*   PO2 ART mm Hg 72.5*   PCO2, ARTERIAL mm Hg 37.8   HCO3 ART mmol/L 26.4*     Results from last 7 days   Lab Units 10/03/21  0249 10/02/21  1728   SODIUM mmol/L 136 134*   POTASSIUM mmol/L 3.9 4.1   MAGNESIUM mg/dL 1.9  --    CHLORIDE mmol/L 99 101   CO2 mmol/L 26.0 22.4   BUN mg/dL 17 16   CREATININE mg/dL 1.30* 1.43*   EGFR IF NONAFRICN AM mL/min/1.73 54* 48*   CALCIUM mg/dL 8.2* 8.1*   GLUCOSE mg/dL 152* 90   ALBUMIN g/dL 3.87 3.61   BILIRUBIN mg/dL 0.5 0.4   ALK PHOS U/L 53 55   AST (SGOT) U/L 35 31   ALT (SGPT) U/L 30 22   Estimated Creatinine Clearance: 56.8 mL/min (A) (by C-G formula based on SCr of 1.3 mg/dL (H)).  No results found for: AMMONIA          Lab Results   Component Value Date    HGBA1C 5.90 (H) 10/03/2021     No results found for: TSH, FREET4  No results found for: PREGTESTUR, PREGSERUM, HCG, HCGQUANT  Pain Management Panel    There is no flowsheet data to display.       No results found for: BLOODCX  No results found for: URINECX  No results found for: WOUNDCX  No results found for: STOOLCX      ---------------------------------------------------------------------------------------------------------------------  Imaging Results (Last 7 Days)     Procedure Component Value Units Date/Time    CT Head Without Contrast [[id number]] Resulted: 10/03/21 0818     Updated: 10/03/21 0825    XR Chest 2 View  "[[id number]] Collected: 10/03/21 0423     Updated: 10/03/21 0425    Narrative:      CR Chest 2 Vws    INDICATION:    Covid pneumonia. Shortness of air.    COMPARISON:    10/14/2013    FINDINGS:   PA and lateral views of the chest.  Lung volumes are quite low. The heart remains enlarged. There are some very mild infiltrates in the lung bases, left greater than right. The lungs are otherwise clear. No pneumothorax is seen.        Impression:      Stable cardiac enlargement with low lung volumes. There are very mild infiltrates in the lung bases.    Signer Name: Yoel Oliveira MD   Signed: 10/3/2021 4:23 AM   Workstation Name: [email or web address]    [facility]            ---------------------------------------------------------------------------------------------------------------------  Assessment / Plan       Assessment and Plan:    Sepsis 2/2 COVID-19 PNA (CRP 2.04, Hypoxia, HR >90, ORALIA)  Acute Hypoxic Respiratory Failure   -Enhanced contact and airborne isolation   -2 sets of blood cultures ordered   -COVID-19 positive on 10/2  -Had REGN-COV2 on 10/2/21  -Has had 1 shot of Pfizer, was due for second shot on 10/4  -Chest xray showed Stable cardiac enlargement with low lung volumes. There are very mild infiltrates in the lung bases.  -D-Dimer on admission is 0.36  -Supplemental oxygen to maintain Sp02 92%  -Decadron 6 mg IV daily   -Remdesivir per protocol   -Push Incentive spirometer   -daily CBC, CMP, CRP, Ferritin, CK   -Q48 hour: D-Dimer,LDH,  Lactic acid, Procalcitonin, INR,     Confusion  -patient is alert and oriented but is very delayed in answering questions   -had a difficult time recalling what city he is in, his birth date and year  -spoke with wife and has been \"different\" since after ER visit on 10/2  -CT of the head ordered   -Neuro checks every 2 hours     Presumed ORALIA  -Creatinine is 1.30 today on 10/3  -was also seen in the ER on 10/2 and creatinine was 1.43  -no previous for " comparison   -monitor urine output   -repeat labs in the am     Hyperglycemia  -glucose is 152  -Hemoglobin A1c 5.90%  -monitor closely with use of decadron     Thrombocytopenia   -PLT count is 88   -PLT count on 10/2 in the ER was 96.   -no reported history of drinking by wife   -Baseline CBC unknown, no previous available, given it is Sunday unable to get records from PCP, but requested.  -Peripheral smear sent  -HIV, Folate, B12 and hepatitis panel pending   -Repeat CBC ordered for 0900 (6 hours from last)  -Daily CBC ordered     HTN  -/69, HR 99  -await home med-rec from pharmacy     Obesity   -BMI 35.44    DVT prophylaxis: SCDs for now (pending CT of the head and trended PLT count)    Code status: Full      Disposition Home once clinically stable     Patient is high risk for the following reasons: Sepsis 2/2 COVID-19 PNA, Acute Hypoxic respiratory Failure, Confusion    Liz Harrington, APRN  10/03/21  08:25 EDT  ---------------------------------------------------------------------------------------------------------------------

## 2021-10-03 NOTE — CONSULTS
INFECTIOUS DISEASE CONSULTATION REPORT        Patient Identification:  Name:  Richardson Odom  Age:  75 y.o.  Sex:  male  :  1945  MRN:  [id number]   Visit Number:  [id number]  Primary Care Physician:  Provider, No Known       LOS: 0 days        Subjective       Subjective     History of present illness:      Thank you Dr. Villeda for allowing us to participate in the care of your patient.  As you well know, Mr. Richardson Odom is a 75 y.o. male with past medical history significant for hypertension, skin cancer, who presented to [facility] Emergency Department on 10/3/2021 for hypoxia.  Patient tested positive on 10/2/2021.  Patient had first dose of Pfizer vaccine.  WBC normal.  CRP 2.04.  Urinalysis unremarkable.  Chest x-ray from 10/3/2021 reports stable cardiac enlargement with low lung volumes, mild infiltrates in the lung bases.  CT of the head from 10/3/2021 reports no abnormality, benign right superior frontal calvarial osteoma, bilateral ethmoid sinus mucosal disease.      Infectious Disease consultation was requested for antimicrobial management.      ---------------------------------------------------------------------------------------------------------------------     Past Medical History    Past Medical History:   Diagnosis Date    Hypertension        Past Surgical History    Past Surgical History:   Procedure Laterality Date    APPENDECTOMY         Family History    History reviewed. No pertinent family history.    Social History    Social History     Tobacco Use    Smoking status: Never Smoker    Smokeless tobacco: Never Used   Substance Use Topics    Alcohol use: Never    Drug use: Never       Allergies    Ciprofibrate and Sulfa antibiotics  ---------------------------------------------------------------------------------------------------------------------     Home Medications:    Prior to Admission Medications       Prescriptions Last Dose Informant Patient Reported? Taking?     amLODIPine (NORVASC) 10 MG tablet 10/2/2021 Spouse/Significant Other Yes Yes    Take 10 mg by mouth Every Night.    cholecalciferol (VITAMIN D3) 25 MCG (1000 UT) tablet 10/2/2021 Spouse/Significant Other Yes Yes    Take 1,000 Units by mouth Daily.    doxazosin (CARDURA) 8 MG tablet 10/2/2021 Spouse/Significant Other Yes Yes    Take 8 mg by mouth Every Morning.    guaiFENesin (MUCINEX) 600 MG 12 hr tablet 10/2/2021 Spouse/Significant Other Yes Yes    Take 1,200 mg by mouth 2 (Two) Times a Day.    pantoprazole (PROTONIX) 40 MG EC tablet 10/2/2021 Spouse/Significant Other Yes Yes    Take 40 mg by mouth Daily.    Zinc 220 (50 Zn) MG capsule 10/2/2021 Spouse/Significant Other Yes Yes    Take 1 capsule by mouth Daily.          ---------------------------------------------------------------------------------------------------------------------    Objective       Objective     Hospital Scheduled Meds:  amLODIPine, 10 mg, Oral, Nightly  Cholecalciferol, 1,200 Units, Oral, Daily  [START ON 10/4/2021] dexamethasone, 6 mg, Intravenous, Daily  guaiFENesin, 1,200 mg, Oral, Q12H  pantoprazole, 40 mg, Oral, Daily  [START ON 10/4/2021] remdesivir, 100 mg, Intravenous, Q24H  sodium chloride, 10 mL, Intravenous, Q12H  terazosin, 5 mg, Oral, Q12H  Zinc, 1 capsule, Oral, Daily         ---------------------------------------------------------------------------------------------------------------------   Vital Signs:  Temp:  [97.1 °F (36.2 °C)-98.7 °F (37.1 °C)] 97.1 °F (36.2 °C)  Heart Rate:  [] 70  Resp:  [20] 20  BP: (127-181)/(56-81) 127/67  Mean Arterial Pressure (Non-Invasive) for the past 24 hrs (Last 3 readings):   Noninvasive MAP (mmHg)   10/03/21 0642 134   10/03/21 0628 100   10/03/21 0613 85     SpO2 Percentage    10/03/21 0733 10/03/21 1005 10/03/21 1059   SpO2: 92% 98% 97%     SpO2:  [90 %-100 %] 97 %  on  Flow (L/min):  [2] 2;   Device (Oxygen Therapy): nasal cannula    Body mass index is 35.24 kg/m².  Wt Readings  from Last 3 Encounters:   10/03/21 102 kg (225 lb)   10/02/21 109 kg (240 lb)     ---------------------------------------------------------------------------------------------------------------------     Physical Exam:    Deferred due to COVID-19 isolation  ---------------------------------------------------------------------------------------------------------------------            Results from last 7 days   Lab Units 10/03/21  0550   PH, ARTERIAL pH units 7.453*   PO2 ART mm Hg 72.5*   PCO2, ARTERIAL mm Hg 37.8   HCO3 ART mmol/L 26.4*     Results from last 7 days   Lab Units 10/03/21  0249 10/02/21  1804   CRP mg/dL 2.04*  --    WBC 10*3/mm3 5.37 3.60   HEMOGLOBIN g/dL 13.9 14.1   HEMATOCRIT % 42.0 43.1   MCV fL 91.7 92.1   MCHC g/dL 33.1 32.7   PLATELETS 10*3/mm3 88* 96*   INR  1.03  --      Results from last 7 days   Lab Units 10/03/21  0249 10/02/21  1728   SODIUM mmol/L 136 134*   POTASSIUM mmol/L 3.9 4.1   MAGNESIUM mg/dL 1.9  --    CHLORIDE mmol/L 99 101   CO2 mmol/L 26.0 22.4   BUN mg/dL 17 16   CREATININE mg/dL 1.30* 1.43*   EGFR IF NONAFRICN AM mL/min/1.73 54* 48*   CALCIUM mg/dL 8.2* 8.1*   GLUCOSE mg/dL 152* 90   ALBUMIN g/dL 3.87 3.61   BILIRUBIN mg/dL 0.5 0.4   ALK PHOS U/L 53 55   AST (SGOT) U/L 35 31   ALT (SGPT) U/L 30 22   Estimated Creatinine Clearance: 56.8 mL/min (A) (by C-G formula based on SCr of 1.3 mg/dL (H)).  No results found for: AMMONIA    Hemoglobin A1C   Date/Time Value Ref Range Status   10/03/2021 0249 5.90 (H) 4.80 - 5.60 % Final     Lab Results   Component Value Date    HGBA1C 5.90 (H) 10/03/2021     No results found for: TSH, FREET4    No results found for: BLOODCX  No results found for: URINECX  No results found for: WOUNDCX  No results found for: STOOLCX  No results found for: RESPCX  Pain Management Panel    There is no flowsheet data to display.       I have personally reviewed the above laboratory results.    ---------------------------------------------------------------------------------------------------------------------  Imaging Results (Last 7 Days)       Procedure Component Value Units Date/Time    CT Head Without Contrast [[id number]] Collected: 10/03/21 0842     Updated: 10/03/21 0844    Narrative:      CT Head WO    HISTORY:   Confusion. Positive for covid 19.    TECHNIQUE:   Axial unenhanced head CT. Radiation dose reduction techniques included automated exposure control or exposure modulation based on body size. Count of known CT and cardiac nuc med studies performed in previous 12 months: 0.     Time of scan: 10/3/2021    COMPARISON:   None.    FINDINGS:   No intracranial hemorrhage, mass, or infarct. No hydrocephalus or extra-axial fluid collection. Brain parenchymal density is normal. 1.6 cm sclerotic nonaggressive bone lesion extending from the right superior frontal calvarium consistent with an  osteoma. Bilateral ethmoid sinus mucosal disease.      Impression:      No acute intracranial abnormality.    Benign right superior frontal calvarial osteoma.    Bilateral ethmoid sinus mucosal disease.        Signer Name: ROSIO Peralta MD   Signed: 10/3/2021 8:42 AM   Workstation Name: [facility]    [facility]    XR Chest 2 View [[id number]] Collected: 10/03/21 0423     Updated: 10/03/21 0425    Narrative:      CR Chest 2 Vws    INDICATION:    Covid pneumonia. Shortness of air.    COMPARISON:    10/14/2013    FINDINGS:   PA and lateral views of the chest.  Lung volumes are quite low. The heart remains enlarged. There are some very mild infiltrates in the lung bases, left greater than right. The lungs are otherwise clear. No pneumothorax is seen.        Impression:      Stable cardiac enlargement with low lung volumes. There are very mild infiltrates in the lung bases.    Signer Name: Yoel Oliveira MD   Signed: 10/3/2021 4:23 AM   Workstation Name: [facility]  [facility] [place]          I have personally reviewed the above radiology results.   ---------------------------------------------------------------------------------------------------------------------      Assessment & Plan        Assessment/Plan       ASSESSMENT:    1.  COVID-19 pneumonia    PLAN:    Patient presents with hypoxia.  Patient tested positive on 10/2/2021.  Patient had first dose of Pfizer vaccine.  WBC normal.  CRP 2.04.  Urinalysis unremarkable.  Chest x-ray from 10/3/2021 reports stable cardiac enlargement with low lung volumes, mild infiltrates in the lung bases.  CT of the head from 10/3/2021 reports no abnormality, benign right superior frontal calvarial osteoma, bilateral ethmoid sinus mucosal disease.    Agree with initiation of remdesivir and Decadron.    For now recommend to monitor off of antibiotic therapy as patient shows typical presentation of COVID-19 infection without superimposed bacterial component.  We will continue to follow closely.    Again, thank you Dr. Villeda for allowing us to participate in the care of your patient and please feel free to call for any questions you may have.        Code Status:     Code Status and Medical Interventions:   Ordered at: 10/03/21 0645     Level Of Support Discussed With:    Patient     Code Status:    CPR     Medical Interventions (Level of Support Prior to Arrest):    Full         Deya Stanton, APRN  10/03/21  12:42 EDT

## 2021-10-03 NOTE — ED NOTES
Patient oxygen saturation was 94% on room air while laying in bed. Patient stood up and walked around room, oxygen saturation dropped to 84%. Provider made aware     Romario Lake RN  10/03/21 0254

## 2021-10-03 NOTE — PLAN OF CARE
Goal Outcome Evaluation:  Plan of Care Reviewed With: patient           Outcome Summary: Patient admitted from the ER, he is on 2 L NC and his oxygen sats have been good on that. He has had no further complaints.

## 2021-10-04 LAB
ALBUMIN SERPL-MCNC: 3.21 G/DL (ref 3.5–5.2)
ALBUMIN/GLOB SERPL: 1.1 G/DL
ALP SERPL-CCNC: 45 U/L (ref 39–117)
ALT SERPL W P-5'-P-CCNC: 29 U/L (ref 1–41)
ANION GAP SERPL CALCULATED.3IONS-SCNC: 8.2 MMOL/L (ref 5–15)
APTT PPP: 32.5 SECONDS (ref 25.5–35.4)
AST SERPL-CCNC: 36 U/L (ref 1–40)
BASOPHILS # BLD AUTO: 0.01 10*3/MM3 (ref 0–0.2)
BASOPHILS NFR BLD AUTO: 0.2 % (ref 0–1.5)
BILIRUB SERPL-MCNC: 0.3 MG/DL (ref 0–1.2)
BUN SERPL-MCNC: 19 MG/DL (ref 8–23)
BUN/CREAT SERPL: 17.9 (ref 7–25)
CALCIUM SPEC-SCNC: 8.1 MG/DL (ref 8.6–10.5)
CHLORIDE SERPL-SCNC: 104 MMOL/L (ref 98–107)
CK SERPL-CCNC: 343 U/L (ref 20–200)
CO2 SERPL-SCNC: 26.8 MMOL/L (ref 22–29)
CREAT SERPL-MCNC: 1.06 MG/DL (ref 0.76–1.27)
CRP SERPL-MCNC: 4.4 MG/DL (ref 0–0.5)
D DIMER PPP FEU-MCNC: 0.4 MCGFEU/ML (ref 0–0.5)
D-LACTATE SERPL-SCNC: 1.1 MMOL/L (ref 0.5–2)
DEPRECATED RDW RBC AUTO: 41.8 FL (ref 37–54)
EOSINOPHIL # BLD AUTO: 0.02 10*3/MM3 (ref 0–0.4)
EOSINOPHIL NFR BLD AUTO: 0.4 % (ref 0.3–6.2)
ERYTHROCYTE [DISTWIDTH] IN BLOOD BY AUTOMATED COUNT: 12.5 % (ref 12.3–15.4)
FERRITIN SERPL-MCNC: 404.8 NG/ML (ref 30–400)
GFR SERPL CREATININE-BSD FRML MDRD: 68 ML/MIN/1.73
GLOBULIN UR ELPH-MCNC: 2.9 GM/DL
GLUCOSE BLDC GLUCOMTR-MCNC: 112 MG/DL (ref 70–130)
GLUCOSE BLDC GLUCOMTR-MCNC: 119 MG/DL (ref 70–130)
GLUCOSE BLDC GLUCOMTR-MCNC: 154 MG/DL (ref 70–130)
GLUCOSE BLDC GLUCOMTR-MCNC: 184 MG/DL (ref 70–130)
GLUCOSE SERPL-MCNC: 128 MG/DL (ref 65–99)
HCT VFR BLD AUTO: 40.3 % (ref 37.5–51)
HGB BLD-MCNC: 13.4 G/DL (ref 13–17.7)
IMM GRANULOCYTES # BLD AUTO: 0.01 10*3/MM3 (ref 0–0.05)
IMM GRANULOCYTES NFR BLD AUTO: 0.2 % (ref 0–0.5)
INR PPP: 1.17 (ref 0.9–1.1)
LDH SERPL-CCNC: 184 U/L (ref 135–225)
LYMPHOCYTES # BLD AUTO: 0.92 10*3/MM3 (ref 0.7–3.1)
LYMPHOCYTES NFR BLD AUTO: 17.9 % (ref 19.6–45.3)
MCH RBC QN AUTO: 30.2 PG (ref 26.6–33)
MCHC RBC AUTO-ENTMCNC: 33.3 G/DL (ref 31.5–35.7)
MCV RBC AUTO: 91 FL (ref 79–97)
MONOCYTES # BLD AUTO: 0.58 10*3/MM3 (ref 0.1–0.9)
MONOCYTES NFR BLD AUTO: 11.3 % (ref 5–12)
NEUTROPHILS NFR BLD AUTO: 3.59 10*3/MM3 (ref 1.7–7)
NEUTROPHILS NFR BLD AUTO: 70 % (ref 42.7–76)
NRBC BLD AUTO-RTO: 0 /100 WBC (ref 0–0.2)
PLATELET # BLD AUTO: 89 10*3/MM3 (ref 140–450)
PMV BLD AUTO: 12 FL (ref 6–12)
POTASSIUM SERPL-SCNC: 3.9 MMOL/L (ref 3.5–5.2)
PROCALCITONIN SERPL-MCNC: 0.09 NG/ML (ref 0–0.25)
PROT SERPL-MCNC: 6.1 G/DL (ref 6–8.5)
PROTHROMBIN TIME: 15.3 SECONDS (ref 12.8–14.5)
RBC # BLD AUTO: 4.43 10*6/MM3 (ref 4.14–5.8)
SODIUM SERPL-SCNC: 139 MMOL/L (ref 136–145)
WBC # BLD AUTO: 5.13 10*3/MM3 (ref 3.4–10.8)

## 2021-10-04 PROCEDURE — 82550 ASSAY OF CK (CPK): CPT | Performed by: HOSPITALIST

## 2021-10-04 PROCEDURE — 94799 UNLISTED PULMONARY SVC/PX: CPT

## 2021-10-04 PROCEDURE — 83605 ASSAY OF LACTIC ACID: CPT | Performed by: HOSPITALIST

## 2021-10-04 PROCEDURE — 86140 C-REACTIVE PROTEIN: CPT | Performed by: HOSPITALIST

## 2021-10-04 PROCEDURE — 85379 FIBRIN DEGRADATION QUANT: CPT | Performed by: HOSPITALIST

## 2021-10-04 PROCEDURE — 99232 SBSQ HOSP IP/OBS MODERATE 35: CPT | Performed by: INTERNAL MEDICINE

## 2021-10-04 PROCEDURE — 85610 PROTHROMBIN TIME: CPT | Performed by: HOSPITALIST

## 2021-10-04 PROCEDURE — 85730 THROMBOPLASTIN TIME PARTIAL: CPT | Performed by: HOSPITALIST

## 2021-10-04 PROCEDURE — 85025 COMPLETE CBC W/AUTO DIFF WBC: CPT | Performed by: HOSPITALIST

## 2021-10-04 PROCEDURE — 82962 GLUCOSE BLOOD TEST: CPT

## 2021-10-04 PROCEDURE — 84145 PROCALCITONIN (PCT): CPT | Performed by: HOSPITALIST

## 2021-10-04 PROCEDURE — 82728 ASSAY OF FERRITIN: CPT | Performed by: HOSPITALIST

## 2021-10-04 PROCEDURE — 25010000002 DEXAMETHASONE PER 1 MG: Performed by: HOSPITALIST

## 2021-10-04 PROCEDURE — 80053 COMPREHEN METABOLIC PANEL: CPT | Performed by: HOSPITALIST

## 2021-10-04 PROCEDURE — 83615 LACTATE (LD) (LDH) ENZYME: CPT | Performed by: HOSPITALIST

## 2021-10-04 RX ADMIN — GUAIFENESIN 1200 MG: 600 TABLET, EXTENDED RELEASE ORAL at 20:23

## 2021-10-04 RX ADMIN — TERAZOSIN HYDROCHLORIDE 5 MG: 5 CAPSULE ORAL at 20:23

## 2021-10-04 RX ADMIN — CHOLECALCIFEROL TAB 10 MCG (400 UNIT) 1200 UNITS: 10 TAB at 09:28

## 2021-10-04 RX ADMIN — SODIUM CHLORIDE, PRESERVATIVE FREE 10 ML: 5 INJECTION INTRAVENOUS at 09:29

## 2021-10-04 RX ADMIN — AMLODIPINE BESYLATE 10 MG: 10 TABLET ORAL at 20:24

## 2021-10-04 RX ADMIN — REMDESIVIR 100 MG: 100 INJECTION, POWDER, LYOPHILIZED, FOR SOLUTION INTRAVENOUS at 09:30

## 2021-10-04 RX ADMIN — PANTOPRAZOLE SODIUM 40 MG: 40 TABLET, DELAYED RELEASE ORAL at 09:29

## 2021-10-04 RX ADMIN — Medication 220 MG: at 09:28

## 2021-10-04 RX ADMIN — TERAZOSIN HYDROCHLORIDE 5 MG: 5 CAPSULE ORAL at 09:28

## 2021-10-04 RX ADMIN — GUAIFENESIN 1200 MG: 600 TABLET, EXTENDED RELEASE ORAL at 09:28

## 2021-10-04 RX ADMIN — DEXAMETHASONE SODIUM PHOSPHATE 6 MG: 4 INJECTION, SOLUTION INTRA-ARTICULAR; INTRALESIONAL; INTRAMUSCULAR; INTRAVENOUS; SOFT TISSUE at 09:29

## 2021-10-04 NOTE — PAYOR COMM NOTE
"[facility]  LEXY PFEIFFER  [phone]  FAX  [phone]  NPI:  [id number]    PENDING REF# [id number]    Richardson Odom (75 y.o. Male)     Date of Birth Social Security Number Address Home Phone MRN    1945  [id number]  [facility] [address] [phone] [id number]    Anglican Marital Status          Unknown        Admission Date Admission Type Admitting Provider Attending Provider Department, Room/Bed    10/3/21 Emergency Robert Cruz MD Hacker, Bill J, MD [address], [address]    Discharge Date Discharge Disposition Discharge Destination                       Attending Provider: Parag Villeda MD    Allergies: Ciprofibrate, Sulfa Antibiotics    Isolation: Enh Drop/Con   Infection: COVID (confirmed) (10/02/21)   Code Status: CPR    Ht: 170.2 cm (67\")   Wt: 103 kg (226 lb 6.4 oz)    Admission Cmt: None   Principal Problem: None                Active Insurance as of 10/3/2021     Primary Coverage     Payor Plan Insurance Group Employer/Plan Group    HUMANA MEDICARE REPLACEMENT HUMANA MEDICARE REPLACEMENT [id number]     Payor Plan Address Payor Plan Phone Number Payor Plan Fax Number Effective Dates    [address] [address] [phone]  1/1/2018 - None Entered    [facility] [id number]       Subscriber Name Subscriber Birth Date Member ID       RICHARDSON ODOM 1945 [id number]                 Emergency Contacts      (Rel.) Home Phone Work Phone Mobile Phone    JIMMY ODOM (Spouse) [phone] -- --               History & Physical      Liz Harrington, SILAS at 10/03/21 0646     Attestation signed by Parag Villeda MD at 10/03/21 1222    I have reviewed this documentation and agree.    Mr. Odom is our 74 yo with hx HTN, superficial skin cancer who presents with hypoxia at home. Patient initially was found to be COVID positive on 10/2 after being exposed at home. Patient had received 1/2 pfizer vaccine and received REGEN-COV on 10/2. Patient " went home after getting monoclonal antibody and became hypoxic. Patient denies any dyspnea on my evaluation. Denies any bleeding or rashes.     Exam:  Gen: NAD, resting supine in hospital bed.   HEENT: Neck supple. No JVD. Mucosa moist.   CV: RRR. No M/R/G  Pulm: CTAB, no wheezes or crackles   MSK: No joint swelling or redness   GI: Abdomen soft, nontender  Skin: No rashes or lumps noted.   EXT: No peripheral edema.   Neuro: No focal neurological deficits, AAOX3. Patient noted that 100-7 was 92.   Psych: Appropriate mood and affect      A/P:  #Sepsis and acute hypoxic respiratory failure 2/2 COVID-19 pneumonia  - Patient presented with tachycardia, tachypnea, and PaO2 of 55 on room air.   - Imaging only revealed mild basilar infiltrates.   - D-dimer wnl. Holding chemical prophylaxis as below.   - Continue dexamethasone, remdesivir.   - Will get procal but currently no evidence of superimposed bacterial infection.   - Will consult ID    #Encephalopathy?  - CT head obtained and no acute findings. Suspect from hypoxia or COVID.     #Thrombocytopenia  - Unclear acuity as baseline unknown. Platelet count 96=>88  - Etiology also unclear. Isolated cytopenia. No history of liver disease.   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 Will get peripheral smear, TSH, folate, B12 levels, repeat CBC, HIV, hepatitis panel   - Have requested PCP records to get baseline   - No evidence of bleeding. No anemia concerning for TTP.     #Mild ORALIA vs. CKD  - Cr 1.43=>1.30. Unknown baseline. Will continue to monitor and avoid nephrotoxins as able     #PreDM  - A1C 5.9%  - Will start SSI if needed given steroids     #HTN  - Continue home regimen     DVT ppx: SCDs given thrombocytopenia     F: PO  E: Replace as needed   N: CC    Code status: Full     Dispo: Pending clinical improvement                     [facility]  History & Physical          Patient Identification:  Name:  Richardson Odom  Age:  75 y.o.  Sex:   male  :  1945  MRN:  [id number]   Admit Date: 10/3/2021   Visit Number:  [id number]  Primary Care Physician:  Provider, No Known    I have seen the patient in conjunction with SILAS Yang and I agree with the following statements:     Subjective     Chief complaint: low oxygen at home    History of presenting illness:    Mr. Odom is a 75 year old male patient who presented to [facility] ED on 10/3/21.  His family was positive on Wednesday, he was also having symptoms but did not get tested. He was seen in the ER yesterday on 10/2/21 due to his daughter wanting him to have the infusion (this was also his date of being positive by swab). On my exam he has a difficult time recalling even his birth date, the year and the city he is in, he is able to give me a medical history. I have called his wife, Reba, She states after being in the ER the first time on 10/2 he was acting different he wasn't driving good on the way home, and his wife made him let her drive home. His oxygen at home was 84% but she states her pulse ox is older, and at times would read into the 90's. He was a little confused at home before coming back to the ER. Their daughter persuaded him to come back to the ER for further evaluation.  He did vomit at home, he has not had any dyspnea, has had a slight cough. He has had the first shot of Pfizer was due for second shot tomorrow. His wife says he is lazy and doesn't want to walk. On my exam he is resting comfortably in his room on 2 LNC with Sp02 95-97%.     His treatment in the ER included Decadron 10mg IV x1. His v/s in the ED were temp 98.7, , RR 20, /77.     His past medical history is significant for HTN, Skin cancers removed. No history of strokes, seizures, blood clots, cancers, heart stents, irregular heart beat or heart failure.He doesn't smoke, with no history of tobacco use, no drugs or alcohol.        ---------------------------------------------------------------------------------------------------------------------   Review of Systems   Constitutional: Positive for chills. Negative for diaphoresis, fatigue and fever.   HENT: Negative for congestion and rhinorrhea.    Respiratory: Positive for cough. Negative for shortness of breath.    Cardiovascular: Negative for chest pain and leg swelling.   Gastrointestinal: Negative for abdominal distention.   Genitourinary: Negative for difficulty urinating.   Musculoskeletal: Negative.  Negative for arthralgias.   Skin: Negative for color change.   Neurological: Negative for dizziness and weakness.   Psychiatric/Behavioral: Positive for confusion. Negative for agitation and behavioral problems.      ---------------------------------------------------------------------------------------------------------------------   Past Medical History:   Diagnosis Date   • Hypertension      Past Surgical History:   Procedure Laterality Date   • APPENDECTOMY       History reviewed. No pertinent family history.  Social History     Socioeconomic History   • Marital status:      Spouse name: Not on file   • Number of children: Not on file   • Years of education: Not on file   • Highest education level: Not on file   Tobacco Use   • Smoking status: Never Smoker   • Smokeless tobacco: Never Used   Substance and Sexual Activity   • Alcohol use: Never   • Drug use: Never   • Sexual activity: Defer     ---------------------------------------------------------------------------------------------------------------------   Allergies:  Ciprofibrate and Sulfa antibiotics  ---------------------------------------------------------------------------------------------------------------------     Medications below are reported home medications pulling from within the system; at this time, these medications have not been reconciled unless otherwise specified and are in the verification  process for further verifcation as current home medications.    Prior to Admission Medications     None        Hospital Scheduled Meds:       ---------------------------------------------------------------------------------------------------------------------   Objective       Vital Signs:  Temp:  [98.7 °F (37.1 °C)] 98.7 °F (37.1 °C)  Heart Rate:  [] 99  Resp:  [20] 20  BP: (130-181)/(56-81) 144/69      10/03/21  0137 10/03/21  0733   Weight: 109 kg (240 lb) 102 kg (225 lb)     Body mass index is 35.24 kg/m².  ---------------------------------------------------------------------------------------------------------------------       Physical Exam    Physical Exam:  Constitutional: male lying on stretcher bed on 2 LNC, Sp02 97%  HENT:  Head: Normocephalic and atraumatic.  Mouth:  Moist mucous membranes.    Eyes:   Pupils are equal, round, and reactive to light.  No scleral icterus.  Neck:  Neck supple.  No JVD present.    Cardiovascular:  Normal rate, regular rhythm.  with no murmur.  Pulmonary/Chest:  No respiratory distress on 2 LNC, no cough.  Abdominal:  Soft.  Bowel sounds are present.  No distension and no tenderness.   Musculoskeletal:  No edema, no tenderness, and no deformity.  No red or swollen joints anywhere.    Neurological:  Alert, on arrival when asked his birth date he was slow to be able to give this to me, he almost acted like he did not know it, he had a very difficult time telling me the current year, did not remember he was in [place] initially but later told me he was. Hand  and pedal pushes strong and equal.   No tongue deviation.  No facial droop.  No slurred speech.   Skin:  Skin is warm and dry.  No rash noted.  No pallor.   Peripheral vascular:  No edema and strong pulses on all 4 extremities.  ---------------------------------------------------------------------------------------------------------------------  EKG:          ---------------------------------------------------------------------------------------------------------------------   Results from last 7 days   Lab Units 10/03/21  0249 10/02/21  1804   CRP mg/dL 2.04*  --    WBC 10*3/mm3 5.37 3.60   HEMOGLOBIN g/dL 13.9 14.1   HEMATOCRIT % 42.0 43.1   MCV fL 91.7 92.1   MCHC g/dL 33.1 32.7   PLATELETS 10*3/mm3 88* 96*   INR  1.03  --      Results from last 7 days   Lab Units 10/03/21  0550   PH, ARTERIAL pH units 7.453*   PO2 ART mm Hg 72.5*   PCO2, ARTERIAL mm Hg 37.8   HCO3 ART mmol/L 26.4*     Results from last 7 days   Lab Units 10/03/21  0249 10/02/21  1728   SODIUM mmol/L 136 134*   POTASSIUM mmol/L 3.9 4.1   MAGNESIUM mg/dL 1.9  --    CHLORIDE mmol/L 99 101   CO2 mmol/L 26.0 22.4   BUN mg/dL 17 16   CREATININE mg/dL 1.30* 1.43*   EGFR IF NONAFRICN AM mL/min/1.73 54* 48*   CALCIUM mg/dL 8.2* 8.1*   GLUCOSE mg/dL 152* 90   ALBUMIN g/dL 3.87 3.61   BILIRUBIN mg/dL 0.5 0.4   ALK PHOS U/L 53 55   AST (SGOT) U/L 35 31   ALT (SGPT) U/L 30 22   Estimated Creatinine Clearance: 56.8 mL/min (A) (by C-G formula based on SCr of 1.3 mg/dL (H)).  No results found for: AMMONIA          Lab Results   Component Value Date    HGBA1C 5.90 (H) 10/03/2021     No results found for: TSH, FREET4  No results found for: PREGTESTUR, PREGSERUM, HCG, HCGQUANT  Pain Management Panel    There is no flowsheet data to display.       No results found for: BLOODCX  No results found for: URINECX  No results found for: WOUNDCX  No results found for: STOOLCX      ---------------------------------------------------------------------------------------------------------------------  Imaging Results (Last 7 Days)     Procedure Component Value Units Date/Time    CT Head Without Contrast [[id number]] Resulted: 10/03/21 0818     Updated: 10/03/21 0825    XR Chest 2 View [[id number]] Collected: 10/03/21 0423     Updated: 10/03/21 0425    Narrative:      CR Chest 2 Vws    INDICATION:    Covid pneumonia. Shortness  "of air.    COMPARISON:    10/14/2013    FINDINGS:   PA and lateral views of the chest.  Lung volumes are quite low. The heart remains enlarged. There are some very mild infiltrates in the lung bases, left greater than right. The lungs are otherwise clear. No pneumothorax is seen.        Impression:      Stable cardiac enlargement with low lung volumes. There are very mild infiltrates in the lung bases.    Signer Name: Yoel Oliveira MD   Signed: 10/3/2021 4:23 AM   Workstation Name: [email or web address]    [facility]            ---------------------------------------------------------------------------------------------------------------------  Assessment / Plan       Assessment and Plan:    Sepsis 2/2 COVID-19 PNA (CRP 2.04, Hypoxia, HR >90, ORALIA)  Acute Hypoxic Respiratory Failure   -Enhanced contact and airborne isolation   -2 sets of blood cultures ordered   -COVID-19 positive on 10/2  -Had REGN-COV2 on 10/2/21  -Has had 1 shot of Pfizer, was due for second shot on 10/4  -Chest xray showed Stable cardiac enlargement with low lung volumes. There are very mild infiltrates in the lung bases.  -D-Dimer on admission is 0.36  -Supplemental oxygen to maintain Sp02 92%  -Decadron 6 mg IV daily   -Remdesivir per protocol   -Push Incentive spirometer   -daily CBC, CMP, CRP, Ferritin, CK   -Q48 hour: D-Dimer,LDH,  Lactic acid, Procalcitonin, INR,     Confusion  -patient is alert and oriented but is very delayed in answering questions   -had a difficult time recalling what city he is in, his birth date and year  -spoke with wife and has been \"different\" since after ER visit on 10/2  -CT of the head ordered   -Neuro checks every 2 hours     Presumed ORALIA  -Creatinine is 1.30 today on 10/3  -was also seen in the ER on 10/2 and creatinine was 1.43  -no previous for comparison   -monitor urine output   -repeat labs in the am     Hyperglycemia  -glucose is 152  -Hemoglobin A1c 5.90%  -monitor closely with use of " decadron     Thrombocytopenia   -PLT count is 88   -PLT count on 10/2 in the ER was 96.   -no reported history of drinking by wife   -Baseline CBC unknown, no previous available, given it is Sunday unable to get records from PCP, but requested.  -Peripheral smear sent  -HIV, Folate, B12 and hepatitis panel pending   -Repeat CBC ordered for 0900 (6 hours from last)  -Daily CBC ordered     HTN  -/69, HR 99  -await home med-rec from pharmacy     Obesity   -BMI 35.44    DVT prophylaxis: SCDs for now (pending CT of the head and trended PLT count)    Code status: Full      Disposition Home once clinically stable     Patient is high risk for the following reasons: Sepsis 2/2 COVID-19 PNA, Acute Hypoxic respiratory Failure, Confusion    Liz Harrington, APRN  10/03/21  08:25 EDT  ---------------------------------------------------------------------------------------------------------------------       Electronically signed by Parag Villeda MD at 10/03/21 1222          Emergency Department Notes      Romario Lake, RN at 10/03/21 0253        Patient oxygen saturation was 94% on room air while laying in bed. Patient stood up and walked around room, oxygen saturation dropped to 84%. Provider made aware     Romario Lake, RN  10/03/21 0254      Electronically signed by Romario Lake, RN at 10/03/21 0254         Current Facility-Administered Medications   Medication Dose Route Frequency Provider Last Rate Last Admin   • amLODIPine (NORVASC) tablet 10 mg  10 mg Oral Nightly Parag Villeda MD   10 mg at 10/03/21 1823   • Cholecalciferol tablet 1,200 Units  1,200 Units Oral Daily Parag Villeda MD       • dexamethasone (DECADRON) injection 6 mg  6 mg Intravenous Daily Robert Cruz MD       • dextrose (D50W) 25 g/ 50mL Intravenous Solution 25 g  25 g Intravenous Q15 Min PRN Liz Harrington, APRN       • dextrose (GLUTOSE) oral gel 15 g  15 g Oral Q15 Min PRN Liz Harrington, APRN       •  glucagon (human recombinant) (GLUCAGEN DIAGNOSTIC) injection 1 mg  1 mg Subcutaneous Q15 Min PRN Liz Harrington APRN       • guaiFENesin (MUCINEX) 12 hr tablet 1,200 mg  1,200 mg Oral Q12H Parag Villeda MD   1,200 mg at 10/03/21 2325   • pantoprazole (PROTONIX) EC tablet 40 mg  40 mg Oral Daily Parag Villeda MD   40 mg at 10/03/21 1317   • remdesivir 100 mg in 270 mL NS  100 mg Intravenous Q24H Parag Villeda MD       • sodium chloride 0.9 % flush 10 mL  10 mL Intravenous Q12H Robert Cruz MD   10 mL at 10/03/21 2326   • sodium chloride 0.9 % flush 10 mL  10 mL Intravenous PRN Robert Cruz MD       • terazosin (HYTRIN) capsule 5 mg  5 mg Oral Q12H Parag Villeda MD   5 mg at 10/03/21 2327   • Zinc capsule 220 mg  1 capsule Oral Daily Parag Villeda MD   220 mg at 10/03/21 1317     Orders (last 24 hrs)      Start     Ordered    10/04/21 0900  dexamethasone (DECADRON) injection 6 mg  Daily      10/03/21 0738    10/04/21 0900  remdesivir 100 mg in 270 mL NS  Every 24 Hours      10/03/21 0754    10/04/21 0722  POC Glucose Once  Once      10/04/21 0716    10/04/21 0600  CBC & Differential  Daily      10/03/21 0647    10/04/21 0600  Comprehensive Metabolic Panel  Daily      10/03/21 0647    10/04/21 0600  CK  Daily      10/03/21 0647    10/04/21 0600  C-reactive Protein  Daily      10/03/21 0647    10/04/21 0600  Ferritin  Daily      10/03/21 0647    10/04/21 0600  Lactate Dehydrogenase  Every Other Day      10/03/21 0647    10/04/21 0600  D-dimer, Quantitative  Every Other Day      10/03/21 0647    10/04/21 0600  Protime-INR  Every Other Day      10/03/21 0647    10/04/21 0600  aPTT  Every Other Day      10/03/21 0647    10/04/21 0600  Procalcitonin  Every Other Day      10/03/21 0647    10/04/21 0600  Lactic Acid, Plasma  Every Other Day      10/03/21 0647    10/04/21 0600  CBC Auto Differential  PROCEDURE ONCE      10/03/21 2206    10/03/21 2100  amLODIPine (NORVASC) tablet 10 mg   Nightly      10/03/21 1114    10/03/21 1709  POC Glucose Once  Once      10/03/21 1652    10/03/21 1300  pantoprazole (PROTONIX) EC tablet 40 mg  Daily      10/03/21 1114    10/03/21 1300  guaiFENesin (MUCINEX) 12 hr tablet 1,200 mg  Every 12 Hours Scheduled      10/03/21 1114    10/03/21 1300  Zinc capsule 220 mg  Daily      10/03/21 1114    10/03/21 1300  terazosin (HYTRIN) capsule 5 mg  Every 12 Hours Scheduled      10/03/21 1114    10/03/21 1200  Strict Intake & Output  Every 6 Hours      10/03/21 0738    10/03/21 1200  POC Glucose Q6H  Every 6 Hours      10/03/21 0807    10/03/21 1200  Cholecalciferol tablet 1,200 Units  Daily      10/03/21 1114    10/03/21 1144  Scan Slide  Once      10/03/21 1143    10/03/21 1005  CBC Auto Differential  STAT      10/03/21 1005    10/03/21 0900  sodium chloride 0.9 % flush 10 mL  Every 12 Hours Scheduled      10/03/21 0738    10/03/21 0900  enoxaparin (LOVENOX) syringe 40 mg  Every 24 Hours,   Status:  Discontinued      10/03/21 0738    10/03/21 0900  remdesivir 200 mg in 290 mL NS  Every 24 Hours      10/03/21 0754    10/03/21 0900  CBC & Differential  Once,   Status:  Canceled      10/03/21 0757    10/03/21 0900  CBC Auto Differential  PROCEDURE ONCE,   Status:  Canceled      10/03/21 0757    10/03/21 0900  Incentive Spirometry  Every Hour      10/03/21 0819    10/03/21 0829  TSH  Once      10/03/21 0828    10/03/21 0829  Vitamin B12  Once      10/03/21 0828    10/03/21 0829  Folate  Once      10/03/21 0828    10/03/21 0828  HIV-1 & HIV-2 Antibodies  Once      10/03/21 0827    10/03/21 0828  Hepatitis Panel, Acute  Once      10/03/21 0827    10/03/21 0828  HIV-1 / O / 2 Ag / Antibody 4th Generation  PROCEDURE ONCE      10/03/21 0827    10/03/21 0814  Get records from PCP (Labs especially) looking for baseline PLT count  Nursing Communication  Once     Comments: Get records from PCP (Labs especially) looking for baseline PLT count    10/03/21 0813    10/03/21 0808  Follow [facility]  Hypoglycemia Standing Orders For Blood Glucose Less Than 70 mg/dL  Until Discontinued     Comments: ALERT PATIENT - NOT NPO & CAN SAFELY SWALLOW  Administer 4 oz Fruit Juice OR 4 oz Regular Soda OR 8 oz Milk OR 15-30 grams (1 tube) of Glucose Gel.  Recheck Blood Glucose Approximately 15 Minutes After Ingestion, Repeat Treatment & Continue to Recheck Blood Sugar Approximately Every 15 Minutes Until Blood Glucose is 70 or Higher.  Once Blood Glucose is 70 or Higher & if It Will Be More Than 60 Minutes Until Next Meal, Provide Appropriate Snack (Including Carbohydrate Food) Based on Meal Plan Order. Give Meal Tray As Soon As Possible.    PATIENT HAS IV ACCESS - UNRESPONSIVE, NPO OR UNABLE TO SAFELY SWALLOW  Administer 25g (50ml) D50W IV Push.  Recheck Blood Glucose Approximately 15 Minutes After Administration, if Blood Glucose Remains Less Than 70, Repeat Treatment   Recheck Blood Glucose Approximately 15 Minutes After 2nd Administration, if Blood Glucose Remains Less Than 70 After 2nd Dose of D50W, Contact Provider for Further Treatment Orders & Consider Adding IVF With D5W for Maintenance    PATIENT WITHOUT IV ACCESS - UNRESPONSIVE, NPO OR UNABLE TO SAFELY SWALLOW  Administer 1mg Glucagon SQ & Establish IV Access.  Turn Patient on Side - Nausea / Vomiting May Occur.  Recheck Blood Glucose Approximately 15 Minutes After Administration.  If Blood Glucose Remains Less Than 70, Administer 25g D50W IV Push (50ml).  Recheck Blood Glucose Approximately 15 Minutes After Administration of D50W, if Blood Glucose Remains Less Than 70, Contact Provider for Further Treatment Orders & Consider Adding IVF With D5 for Maintenance    Document Event & Patient Response to Interventions in EMR, Document Medications on MAR  Notify Provider if Hypoglycemia Treatment Needed    10/03/21 0807    10/03/21 0807  dextrose (GLUTOSE) oral gel 15 g  Every 15 Minutes PRN      10/03/21 0807    10/03/21 0807  dextrose (D50W) 25 g/ 50mL Intravenous  Solution 25 g  Every 15 Minutes PRN      10/03/21 0807    10/03/21 0807  glucagon (human recombinant) (GLUCAGEN DIAGNOSTIC) injection 1 mg  Every 15 Minutes PRN      10/03/21 0807    10/03/21 0800  Vital Signs  Every 8 Hours     Comments: Per per hospital policy    10/03/21 0738    10/03/21 0800  Neuro Checks  Every 2 Hours      10/03/21 0751    10/03/21 0759  Place Sequential Compression Device  Once      10/03/21 0758    10/03/21 0759  Maintain Sequential Compression Device  Continuous      10/03/21 0758    10/03/21 0756  Peripheral Blood Smear  Once      10/03/21 0757    10/03/21 0755  Inpatient Case Management  Consult  Once     Provider:  (Not yet assigned)    10/03/21 0754    10/03/21 0751  CT Head Without Contrast  1 Time Imaging      10/03/21 0751    10/03/21 0739  Notify Physician (with Parameters)  Until Discontinued      10/03/21 0738    10/03/21 0739  Oxygen Therapy- Nasal Cannula; Titrate for SPO2: 90% - 95%  Continuous      10/03/21 0738    10/03/21 0739  Insert Peripheral IV  Once      10/03/21 0738    10/03/21 0739  Saline Lock & Maintain IV Access  Continuous      10/03/21 0738    10/03/21 0739  Pulse Oximetry, Continuous  Continuous      10/03/21 0738    10/03/21 0739  Cardiac Monitoring  Continuous      10/03/21 0738    10/03/21 0739  Activity - Bed Rest With Exceptions (Specify)  Until Discontinued      10/03/21 0738    10/03/21 0739  Daily Weights  Daily      10/03/21 0738    10/03/21 0739  Diet Regular  Diet Effective Now      10/03/21 0738    10/03/21 0739  Inpatient Infectious Diseases Consult  Once     Specialty:  Infectious Diseases  Provider:  To Smith MD    10/03/21 0738    10/03/21 0738  sodium chloride 0.9 % flush 10 mL  As Needed      10/03/21 0738    10/03/21 0738  Pharmacy Consult - Pharmacy to dose  Continuous PRN,   Status:  Discontinued      10/03/21 0738    Unscheduled  Up With Assistance  As Needed      10/03/21 0738    --  pantoprazole (PROTONIX) 40  MG EC tablet  Daily      10/03/21 0906    --  amLODIPine (NORVASC) 10 MG tablet  Nightly      10/03/21 0906    --  guaiFENesin (MUCINEX) 600 MG 12 hr tablet  2 Times Daily      10/03/21 0906    --  doxazosin (CARDURA) 8 MG tablet  Every Morning      10/03/21 0906    --  Zinc 220 (50 Zn) MG capsule  Daily      10/03/21 0906    --  cholecalciferol (VITAMIN D3) 25 MCG (1000 UT) tablet  Daily      10/03/21 0906                Physician Progress Notes (last 24 hours) (Notes from 10/03/21 0736 through 10/04/21 0736)    No notes of this type exist for this encounter.            Consult Notes (last 24 hours) (Notes from 10/03/21 0736 through 10/04/21 0736)      Deya Stanton APRN at 10/03/21 1242      Consult Orders    1. Inpatient Infectious Diseases Consult [[id number]] ordered by Robert Cruz MD at 10/03/21 0645                       INFECTIOUS DISEASE CONSULTATION REPORT        Patient Identification:  Name:  Richardson Odom  Age:  75 y.o.  Sex:  male  :  1945  MRN:  [id number]   Visit Number:  [id number]  Primary Care Physician:  Provider, No Known       LOS: 0 days        Subjective       Subjective     History of present illness:      Thank you Dr. Villeda for allowing us to participate in the care of your patient.  As you well know, Mr. Richardson Odom is a 75 y.o. male with past medical history significant for hypertension, skin cancer, who presented to [facility] Emergency Department on 10/3/2021 for hypoxia.  Patient tested positive on 10/2/2021.  Patient had first dose of Pfizer vaccine.  WBC normal.  CRP 2.04.  Urinalysis unremarkable.  Chest x-ray from 10/3/2021 reports stable cardiac enlargement with low lung volumes, mild infiltrates in the lung bases.  CT of the head from 10/3/2021 reports no abnormality, benign right superior frontal calvarial osteoma, bilateral ethmoid sinus mucosal disease.      Infectious Disease consultation was requested for antimicrobial  management.      ---------------------------------------------------------------------------------------------------------------------     Past Medical History    Past Medical History:   Diagnosis Date   • Hypertension        Past Surgical History    Past Surgical History:   Procedure Laterality Date   • APPENDECTOMY         Family History    History reviewed. No pertinent family history.    Social History    Social History     Tobacco Use   • Smoking status: Never Smoker   • Smokeless tobacco: Never Used   Substance Use Topics   • Alcohol use: Never   • Drug use: Never       Allergies    Ciprofibrate and Sulfa antibiotics  ---------------------------------------------------------------------------------------------------------------------     Home Medications:    Prior to Admission Medications     Prescriptions Last Dose Informant Patient Reported? Taking?    amLODIPine (NORVASC) 10 MG tablet 10/2/2021 Spouse/Significant Other Yes Yes    Take 10 mg by mouth Every Night.    cholecalciferol (VITAMIN D3) 25 MCG (1000 UT) tablet 10/2/2021 Spouse/Significant Other Yes Yes    Take 1,000 Units by mouth Daily.    doxazosin (CARDURA) 8 MG tablet 10/2/2021 Spouse/Significant Other Yes Yes    Take 8 mg by mouth Every Morning.    guaiFENesin (MUCINEX) 600 MG 12 hr tablet 10/2/2021 Spouse/Significant Other Yes Yes    Take 1,200 mg by mouth 2 (Two) Times a Day.    pantoprazole (PROTONIX) 40 MG EC tablet 10/2/2021 Spouse/Significant Other Yes Yes    Take 40 mg by mouth Daily.    Zinc 220 (50 Zn) MG capsule 10/2/2021 Spouse/Significant Other Yes Yes    Take 1 capsule by mouth Daily.        ---------------------------------------------------------------------------------------------------------------------    Objective       Objective     Hospital Scheduled Meds:  amLODIPine, 10 mg, Oral, Nightly  Cholecalciferol, 1,200 Units, Oral, Daily  [START ON 10/4/2021] dexamethasone, 6 mg, Intravenous, Daily  guaiFENesin, 1,200 mg, Oral,  Q12H  pantoprazole, 40 mg, Oral, Daily  [START ON 10/4/2021] remdesivir, 100 mg, Intravenous, Q24H  sodium chloride, 10 mL, Intravenous, Q12H  terazosin, 5 mg, Oral, Q12H  Zinc, 1 capsule, Oral, Daily         ---------------------------------------------------------------------------------------------------------------------   Vital Signs:  Temp:  [97.1 °F (36.2 °C)-98.7 °F (37.1 °C)] 97.1 °F (36.2 °C)  Heart Rate:  [] 70  Resp:  [20] 20  BP: (127-181)/(56-81) 127/67  Mean Arterial Pressure (Non-Invasive) for the past 24 hrs (Last 3 readings):   Noninvasive MAP (mmHg)   10/03/21 0642 134   10/03/21 0628 100   10/03/21 0613 85     SpO2 Percentage    10/03/21 0733 10/03/21 1005 10/03/21 1059   SpO2: 92% 98% 97%     SpO2:  [90 %-100 %] 97 %  on  Flow (L/min):  [2] 2;   Device (Oxygen Therapy): nasal cannula    Body mass index is 35.24 kg/m².  Wt Readings from Last 3 Encounters:   10/03/21 102 kg (225 lb)   10/02/21 109 kg (240 lb)     ---------------------------------------------------------------------------------------------------------------------     Physical Exam:    Deferred due to COVID-19 isolation  ---------------------------------------------------------------------------------------------------------------------            Results from last 7 days   Lab Units 10/03/21  0550   PH, ARTERIAL pH units 7.453*   PO2 ART mm Hg 72.5*   PCO2, ARTERIAL mm Hg 37.8   HCO3 ART mmol/L 26.4*     Results from last 7 days   Lab Units 10/03/21  0249 10/02/21  1804   CRP mg/dL 2.04*  --    WBC 10*3/mm3 5.37 3.60   HEMOGLOBIN g/dL 13.9 14.1   HEMATOCRIT % 42.0 43.1   MCV fL 91.7 92.1   MCHC g/dL 33.1 32.7   PLATELETS 10*3/mm3 88* 96*   INR  1.03  --      Results from last 7 days   Lab Units 10/03/21  0249 10/02/21  1728   SODIUM mmol/L 136 134*   POTASSIUM mmol/L 3.9 4.1   MAGNESIUM mg/dL 1.9  --    CHLORIDE mmol/L 99 101   CO2 mmol/L 26.0 22.4   BUN mg/dL 17 16   CREATININE mg/dL 1.30* 1.43*   EGFR IF NONAFRICN AM  mL/min/1.73 54* 48*   CALCIUM mg/dL 8.2* 8.1*   GLUCOSE mg/dL 152* 90   ALBUMIN g/dL 3.87 3.61   BILIRUBIN mg/dL 0.5 0.4   ALK PHOS U/L 53 55   AST (SGOT) U/L 35 31   ALT (SGPT) U/L 30 22   Estimated Creatinine Clearance: 56.8 mL/min (A) (by C-G formula based on SCr of 1.3 mg/dL (H)).  No results found for: AMMONIA    Hemoglobin A1C   Date/Time Value Ref Range Status   10/03/2021 0249 5.90 (H) 4.80 - 5.60 % Final     Lab Results   Component Value Date    HGBA1C 5.90 (H) 10/03/2021     No results found for: TSH, FREET4    No results found for: BLOODCX  No results found for: URINECX  No results found for: WOUNDCX  No results found for: STOOLCX  No results found for: RESPCX  Pain Management Panel    There is no flowsheet data to display.       I have personally reviewed the above laboratory results.   ---------------------------------------------------------------------------------------------------------------------  Imaging Results (Last 7 Days)     Procedure Component Value Units Date/Time    CT Head Without Contrast [[id number]] Collected: 10/03/21 0842     Updated: 10/03/21 0844    Narrative:      CT Head WO    HISTORY:   Confusion. Positive for covid 19.    TECHNIQUE:   Axial unenhanced head CT. Radiation dose reduction techniques included automated exposure control or exposure modulation based on body size. Count of known CT and cardiac nuc med studies performed in previous 12 months: 0.     Time of scan: 10/3/2021    COMPARISON:   None.    FINDINGS:   No intracranial hemorrhage, mass, or infarct. No hydrocephalus or extra-axial fluid collection. Brain parenchymal density is normal. 1.6 cm sclerotic nonaggressive bone lesion extending from the right superior frontal calvarium consistent with an  osteoma. Bilateral ethmoid sinus mucosal disease.      Impression:      No acute intracranial abnormality.    Benign right superior frontal calvarial osteoma.    Bilateral ethmoid sinus mucosal disease.        Signer  Name: ROSIO Peralta MD   Signed: 10/3/2021 8:42 AM   Workstation Name: [email or web address]    [facility]    XR Chest 2 View [[id number]] Collected: 10/03/21 0423     Updated: 10/03/21 0425    Narrative:      CR Chest 2 Vws    INDICATION:    Covid pneumonia. Shortness of air.    COMPARISON:    10/14/2013    FINDINGS:   PA and lateral views of the chest.  Lung volumes are quite low. The heart remains enlarged. There are some very mild infiltrates in the lung bases, left greater than right. The lungs are otherwise clear. No pneumothorax is seen.        Impression:      Stable cardiac enlargement with low lung volumes. There are very mild infiltrates in the lung bases.    Signer Name: Yoel Oliveira MD   Signed: 10/3/2021 4:23 AM   Workstation Name: [id number]    [facility]        I have personally reviewed the above radiology results.   ---------------------------------------------------------------------------------------------------------------------      Assessment & Plan        Assessment/Plan       ASSESSMENT:    1.  COVID-19 pneumonia    PLAN:    Patient presents with hypoxia.  Patient tested positive on 10/2/2021.  Patient had first dose of Pfizer vaccine.  WBC normal.  CRP 2.04.  Urinalysis unremarkable.  Chest x-ray from 10/3/2021 reports stable cardiac enlargement with low lung volumes, mild infiltrates in the lung bases.  CT of the head from 10/3/2021 reports no abnormality, benign right superior frontal calvarial osteoma, bilateral ethmoid sinus mucosal disease.    Agree with initiation of remdesivir and Decadron.    For now recommend to monitor off of antibiotic therapy as patient shows typical presentation of COVID-19 infection without superimposed bacterial component.  We will continue to follow closely.    Again, thank you Dr. Villeda for allowing us to participate in the care of your patient and please feel free to call for any questions you may  have.        Code Status:     Code Status and Medical Interventions:   Ordered at: 10/03/21 0645     Level Of Support Discussed With:    Patient     Code Status:    CPR     Medical Interventions (Level of Support Prior to Arrest):    Full         SILAS Ramirez  10/03/21  12:42 EDT    Electronically signed by Deya Stanton APRN at 10/03/21 9276

## 2021-10-04 NOTE — PROGRESS NOTES
PROGRESS NOTE         Patient Identification:  Name:  Richardson Odom  Age:  75 y.o.  Sex:  male  :  1945  MRN:  [id number]  Visit Number:  [id number]  Primary Care Provider:  Provider, No Known         LOS: 1 day       ----------------------------------------------------------------------------------------------------------------------  Subjective       Chief Complaints:    Shortness of Breath        Interval History:      Patient continues on 2 L nasal cannula today with no apparent distress.  Afebrile, no diarrhea.  WBC normal.  CRP slightly worsened at 4.40, no evidence of worsening sepsis.    ----------------------------------------------------------------------------------------------------------------------      Objective       [facility] Meds:  amLODIPine, 10 mg, Oral, Nightly  Cholecalciferol, 1,200 Units, Oral, Daily  dexamethasone, 6 mg, Intravenous, Daily  guaiFENesin, 1,200 mg, Oral, Q12H  pantoprazole, 40 mg, Oral, Daily  remdesivir, 100 mg, Intravenous, Q24H  sodium chloride, 10 mL, Intravenous, Q12H  terazosin, 5 mg, Oral, Q12H  Zinc, 1 capsule, Oral, Daily         ----------------------------------------------------------------------------------------------------------------------    Vital Signs:  Temp:  [97.8 °F (36.6 °C)-98 °F (36.7 °C)] 97.8 °F (36.6 °C)  Heart Rate:  [57-88] 70  Resp:  [18-20] 20  BP: (123-151)/(69-87) 143/77  No data found.  SpO2 Percentage    10/03/21 1900 10/04/21 0712 10/04/21 1054   SpO2: 99% 98% 99%     SpO2:  [98 %-99 %] 99 %  on  Flow (L/min):  [2] 2;   Device (Oxygen Therapy): nasal cannula    Body mass index is 35.46 kg/m².  Wt Readings from Last 3 Encounters:   10/04/21 103 kg (226 lb 6.4 oz)   10/02/21 109 kg (240 lb)        Intake/Output Summary (Last 24 hours) at 10/4/2021 1118  Last data filed at 10/4/2021 1054  Gross per 24 hour   Intake 920 ml   Output 1550 ml   Net -630 ml     Diet  Regular  ----------------------------------------------------------------------------------------------------------------------      Physical Exam:    Deferred due to COVID-19 isolation    ----------------------------------------------------------------------------------------------------------------------  Results from last 7 days   Lab Units 10/04/21  0811   CK TOTAL U/L 343*         Results from last 7 days   Lab Units 10/03/21  0550   PH, ARTERIAL pH units 7.453*   PO2 ART mm Hg 72.5*   PCO2, ARTERIAL mm Hg 37.8   HCO3 ART mmol/L 26.4*     Results from last 7 days   Lab Units 10/04/21  0811 10/03/21  0946 10/03/21  0249   CRP mg/dL 4.40*  --  2.04*   LACTATE mmol/L 1.1  --   --    WBC 10*3/mm3 5.13 4.96 5.37   HEMOGLOBIN g/dL 13.4 13.6 13.9   HEMATOCRIT % 40.3 41.2 42.0   MCV fL 91.0 91.4 91.7   MCHC g/dL 33.3 33.0 33.1   PLATELETS 10*3/mm3 89* 84* 88*   INR  1.17*  --  1.03     Results from last 7 days   Lab Units 10/04/21  0811 10/03/21  0249 10/02/21  1728   SODIUM mmol/L 139 136 134*   POTASSIUM mmol/L 3.9 3.9 4.1   MAGNESIUM mg/dL  --  1.9  --    CHLORIDE mmol/L 104 99 101   CO2 mmol/L 26.8 26.0 22.4   BUN mg/dL 19 17 16   CREATININE mg/dL 1.06 1.30* 1.43*   EGFR IF NONAFRICN AM mL/min/1.73 68 54* 48*   CALCIUM mg/dL 8.1* 8.2* 8.1*   GLUCOSE mg/dL 128* 152* 90   ALBUMIN g/dL 3.21* 3.87 3.61   BILIRUBIN mg/dL 0.3 0.5 0.4   ALK PHOS U/L 45 53 55   AST (SGOT) U/L 36 35 31   ALT (SGPT) U/L 29 30 22   Estimated Creatinine Clearance: 70 mL/min (by C-G formula based on SCr of 1.06 mg/dL).  No results found for: AMMONIA    Hemoglobin A1C   Date/Time Value Ref Range Status   10/03/2021 0249 5.90 (H) 4.80 - 5.60 % Final     Glucose   Date/Time Value Ref Range Status   10/04/2021 1104 154 (H) 70 - 130 mg/dL Final     Comment:     Meter: [id number] : [id number] MARYANNE LEE   10/04/2021 0716 119 70 - 130 mg/dL Final     Comment:     Meter: [id number] : [id number] MARYANNE LEE   10/03/2021 1652 182 (H) 70 -  130 mg/dL Final     Comment:     Meter: [id number] : [id number] Jorge Dunn     Lab Results   Component Value Date    HGBA1C 5.90 (H) 10/03/2021     Lab Results   Component Value Date    TSH 0.521 10/03/2021       Blood Culture   Date Value Ref Range Status   10/03/2021 No growth at 24 hours  Preliminary   10/03/2021 No growth at 24 hours  Preliminary     No results found for: URINECX  No results found for: WOUNDCX  No results found for: STOOLCX  No results found for: RESPCX  Pain Management Panel    There is no flowsheet data to display.           ----------------------------------------------------------------------------------------------------------------------  Imaging Results (Last 24 Hours)       ** No results found for the last 24 hours. **            ----------------------------------------------------------------------------------------------------------------------    Assessment/Plan       Assessment/Plan     ASSESSMENT:    1.  COVID-19 pneumonia    PLAN:    Patient continues on 2 L nasal cannula today with no apparent distress.  Afebrile, no diarrhea.  WBC normal.  CRP slightly worsened at 4.40, no evidence of worsening sepsis.     Patient tested positive on 10/2/2021.  Patient had first dose of Pfizer vaccine. Urinalysis unremarkable.  Chest x-ray from 10/3/2021 reports stable cardiac enlargement with low lung volumes, mild infiltrates in the lung bases.  CT of the head from 10/3/2021 reports no abnormality, benign right superior frontal calvarial osteoma, bilateral ethmoid sinus mucosal disease.    Patient receiving remdesivir and Decadron.     For now recommend to monitor off of antibiotic therapy as patient shows typical presentation of COVID-19 infection without superimposed bacterial component.      Patient stable from ID standpoint.  ID will sign off for now.  Please contact us for any  worsening or if we can further assist in this case.       Code Status:   Code Status and Medical  Interventions:   Ordered at: 10/03/21 0645     Level Of Support Discussed With:    Patient     Code Status:    CPR     Medical Interventions (Level of Support Prior to Arrest):    Full     Scribed for Dr. To Smith MD by SILAS Ramirez  10/04/21 11:18 EDT          SILAS Ramirez  10/04/21  11:18 EDT    Physician Attestation:    The documentation recorded by the scribe accurately reflects the service I personally performed and the decisions made by me.    To Smith MD  10/05/21  08:39 EDT

## 2021-10-04 NOTE — CASE MANAGEMENT/SOCIAL WORK
Discharge Planning Assessment   [facility]     Patient Name: Richardson Odom  MRN: [id number]  Today's Date: 10/4/2021    Admit Date: 10/3/2021    Discharge Needs Assessment     Row Name 10/04/21 1257       Living Environment    Lives With  spouse    Name(s) of Who Lives With Patient  Reba    Current Living Arrangements  home/apartment/condo    Primary Care Provided by  self    Provides Primary Care For  no one    Family Caregiver if Needed  spouse    Family Caregiver Names  Reba    Quality of Family Relationships  helpful;involved;supportive    Able to Return to Prior Arrangements  yes       Resource/Environmental Concerns    Resource/Environmental Concerns  none    Transportation Concerns  car, none       Transition Planning    Patient/Family Anticipates Transition to  home with family    Patient/Family Anticipated Services at Transition  none    Transportation Anticipated  family or friend will provide       Discharge Needs Assessment    Equipment Currently Used at Home  none    Concerns to be Addressed  no discharge needs identified    Anticipated Changes Related to Illness  none    Equipment Needed After Discharge  none        Discharge Plan     Row Name 10/04/21 1257       Plan    Plan Pt lives at home with spouse, Reba. Pt does not utilize home health services or DME at this time. PCP is Dr. Mack. Pt does not have a POA or living will on file. Pt's spouse to provide transportation home at discharge. SS to follow and assist.    Patient/Family in Agreement with Plan  yes        Demographic Summary     Row Name 10/04/21 1256       General Information    Referral Source  nursing    Reason for Consult  -- per admission screen        RYAN Felix

## 2021-10-04 NOTE — PROGRESS NOTES
[facility] HOSPITALIST PROGRESS NOTE     Patient Identification:  Name:  Richardson Odom  Age:  75 y.o.  Sex:  male  :  1945  MRN:  [id number]  Visit Number:  [id number]  ROOM: [address] [place] [address]     Primary Care Provider:  Kreis, Samuel Duane, MD    Length of stay in inpatient status:  1    Subjective     Chief Compliant:    Chief Complaint   Patient presents with   • Shortness of Breath       History of Presenting Illness:    Patient denies any new complaints. He reports breathing has improved and we have been able to wean O2.     ROS:  Otherwise 10 point ROS negative other than documented above in HPI.     Objective     Current Hospital Meds:amLODIPine, 10 mg, Oral, Nightly  Cholecalciferol, 1,200 Units, Oral, Daily  dexamethasone, 6 mg, Intravenous, Daily  guaiFENesin, 1,200 mg, Oral, Q12H  pantoprazole, 40 mg, Oral, Daily  remdesivir, 100 mg, Intravenous, Q24H  sodium chloride, 10 mL, Intravenous, Q12H  terazosin, 5 mg, Oral, Q12H  Zinc, 1 capsule, Oral, Daily         Current Antimicrobial Therapy:  Anti-Infectives (From admission, onward)    Ordered     Dose/Rate Route Frequency Start Stop    10/03/21 0754  remdesivir 100 mg in 270 mL NS     Ordering Provider: Parag Villeda MD    100 mg  over 60 Minutes Intravenous Every 24 Hours 10/04/21 0900 10/08/21 0859    10/03/21 0754  remdesivir 200 mg in 290 mL NS     Ordering Provider: Parag Villeda MD    200 mg  over 60 Minutes Intravenous Every 24 Hours 10/03/21 0900 10/03/21 1130        Current Diuretic Therapy:  Diuretics (From admission, onward)    None        ----------------------------------------------------------------------------------------------------------------------  Vital Signs:  Temp:  [97.8 °F (36.6 °C)-98 °F (36.7 °C)] 97.8 °F (36.6 °C)  Heart Rate:  [57-70] 70  Resp:  [18-20] 20  BP: (127-143)/(69-77) 143/77  SpO2:  [94 %-99 %] 94 %  on  Flow (L/min):  [1-2] 1;   Device (Oxygen Therapy): room air  Body mass index is 35.46  kg/m².    Wt Readings from Last 3 Encounters:   10/04/21 103 kg (226 lb 6.4 oz)   10/02/21 109 kg (240 lb)     Intake & Output (last 3 days)       10/02 0701 - 10/03 0700 10/03 0701 - 10/04 0700 10/04 0701 - 10/05 0700    P.O.  920 480    Total Intake(mL/kg)  920 (8.9) 480 (4.7)    Urine (mL/kg/hr)  1550 (0.6) 550 (0.4)    Total Output  1550 550    Net  -630 -70           Urine Unmeasured Occurrence  1 x 1 x        Diet Regular  ----------------------------------------------------------------------------------------------------------------------  Physical exam:  This physical exam has been personally performed remotely in the unit aided by real-time audio/visual communication tools. RAULITO Lira present at bedside during this exam and assisted during exam. The use of a video visit has been reviewed with the patient and verbal informed consent has been obtained.     Physical Exam:  General: Patient appears awake, alert, and in no acute distress.  Head: Normocephalic, atraumatic  Eyes: EOMI. Conjunctivae and sclerae normal.  Ears: Ears appear intact with no abnormalities noted.   Neck: Trachea midline. No obvious JVD.  Lungs: Respirations appear to be regular, even and unlabored with no signs of respiratory distress. No audible wheezing.  Abdomen: No obvious abdominal distension.  MS: Muscle tone appears normal. No gross deformities.  Extremities: No clubbing, cyanosis or edema noted.  Skin: No visible bleeding, bruising, or rash.  Neurologic: Alert and oriented x3. No gross focal deficits.   ----------------------------------------------------------------------------------------------------------------------  Tele:    ----------------------------------------------------------------------------------------------------------------------  Results from last 7 days   Lab Units 10/04/21  0811 10/03/21  0946 10/03/21  0249   CRP mg/dL 4.40*  --  2.04*   LACTATE mmol/L 1.1  --   --    WBC 10*3/mm3 5.13 4.96 5.37   HEMOGLOBIN  g/dL 13.4 13.6 13.9   HEMATOCRIT % 40.3 41.2 42.0   MCV fL 91.0 91.4 91.7   MCHC g/dL 33.3 33.0 33.1   PLATELETS 10*3/mm3 89* 84* 88*   INR  1.17*  --  1.03     Results from last 7 days   Lab Units 10/03/21  0550   PH, ARTERIAL pH units 7.453*   PO2 ART mm Hg 72.5*   PCO2, ARTERIAL mm Hg 37.8   HCO3 ART mmol/L 26.4*     Results from last 7 days   Lab Units 10/04/21  0811 10/03/21  0249 10/02/21  1728   SODIUM mmol/L 139 136 134*   POTASSIUM mmol/L 3.9 3.9 4.1   MAGNESIUM mg/dL  --  1.9  --    CHLORIDE mmol/L 104 99 101   CO2 mmol/L 26.8 26.0 22.4   BUN mg/dL 19 17 16   CREATININE mg/dL 1.06 1.30* 1.43*   EGFR IF NONAFRICN AM mL/min/1.73 68 54* 48*   CALCIUM mg/dL 8.1* 8.2* 8.1*   GLUCOSE mg/dL 128* 152* 90   ALBUMIN g/dL 3.21* 3.87 3.61   BILIRUBIN mg/dL 0.3 0.5 0.4   ALK PHOS U/L 45 53 55   AST (SGOT) U/L 36 35 31   ALT (SGPT) U/L 29 30 22   Estimated Creatinine Clearance: 70 mL/min (by C-G formula based on SCr of 1.06 mg/dL).  No results found for: AMMONIA  Results from last 7 days   Lab Units 10/04/21  0811   CK TOTAL U/L 343*             Hemoglobin A1C   Date/Time Value Ref Range Status   10/03/2021 0249 5.90 (H) 4.80 - 5.60 % Final     Glucose   Date/Time Value Ref Range Status   10/04/2021 1704 184 (H) 70 - 130 mg/dL Final     Comment:     Meter: [id number] : [id number] José Navarro   10/04/2021 1104 154 (H) 70 - 130 mg/dL Final     Comment:     Meter: [id number] : [id number] MARYANNE LEE   10/04/2021 0716 119 70 - 130 mg/dL Final     Comment:     Meter: [id number] : [id number] MARYANNE LEE   10/03/2021 1652 182 (H) 70 - 130 mg/dL Final     Comment:     Meter: [id number] : [id number] Jorge Dunn     Lab Results   Component Value Date    TSH 0.521 10/03/2021     No results found for: PREGTESTUR, PREGSERUM, HCG, HCGQUANT  Pain Management Panel    There is no flowsheet data to display.       Brief Urine Lab Results  (Last result in the past 365 days)      Color   Clarity   Blood    Leuk Est   Nitrite   Protein   CREAT   Urine HCG        10/02/21 1818 Dark Yellow Clear Negative Negative Negative 30 mg/dL (1+)             Blood Culture   Date Value Ref Range Status   10/03/2021 No growth at 24 hours  Preliminary   10/03/2021 No growth at 24 hours  Preliminary     No results found for: URINECX  No results found for: WOUNDCX  No results found for: STOOLCX  No results found for: RESPCX  No results found for: AFBCX  Results from last 7 days   Lab Units 10/04/21  0811 10/03/21  0946 10/03/21  0249   PROCALCITONIN ng/mL 0.09 0.13  --    LACTATE mmol/L 1.1  --   --    CRP mg/dL 4.40*  --  2.04*       I have personally looked at the labs and they are summarized above.  ----------------------------------------------------------------------------------------------------------------------  Detailed radiology reports for the last 24 hours:    Imaging Results (Last 24 Hours)     ** No results found for the last 24 hours. **        Assessment & Plan    #Sepsis and acute hypoxic respiratory failure 2/2 COVID-19 pneumonia  - Patient presented with tachycardia, tachypnea, and PaO2 of 55 on room air.   - Imaging only revealed mild basilar infiltrates.   - D-dimer wnl. Holding chemical prophylaxis as below.   - Continue dexamethasone, remdesivir.   - Procal wnl, currently no evidence of superimposed bacterial infection.   - Will consult ID  - Improving.      #Encephalopathy?  - CT head obtained and no acute findings. Suspect from hypoxia or COVID.      #Acute on chronic thrombocytopenia  - Unclear acuity but there appears to be chronic component as platelet level in 4/21 was 144. Platelet count 96=>88=->84=>88  - Etiology also unclear. Isolated cytopenia. No history of liver disease.   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 TSH, folate, B12 levels, repeat CBC, HIV, hepatitis panel wnl.   - No evidence of bleeding. No anemia concerning for TTP.   - Peripheral smear pending. Improving.      #Mild ORALIA vs. CKD  - Cr  1.43=>1.30=>1.06 Unknown baseline. Will continue to monitor and avoid nephrotoxins as able      #PreDM  - A1C 5.9%  - Will start SSI if needed given steroids      #HTN  - Continue home regimen      DVT ppx: SCDs given thrombocytopenia      F: PO  E: Replace as needed   N: CC     Code status: Full      Dispo: Pending clinical improvement       VTE Prophylaxis:   Mechanical Order History:      Ordered        10/03/21 0758  Place Sequential Compression Device  Once         10/03/21 0758  Maintain Sequential Compression Device  Continuous                 Pharmalogical Order History:      Ordered     Dose Route Frequency Stop    10/03/21 0738  enoxaparin (LOVENOX) syringe 40 mg  Status:  Discontinued      40 mg SC Every 24 Hours 10/03/21 0758                  Parag Villeda MD  [facility]  10/04/21  19:29 EDT

## 2021-10-05 ENCOUNTER — READMISSION MANAGEMENT (OUTPATIENT)
Dept: CALL CENTER | Facility: HOSPITAL | Age: 76
End: 2021-10-05

## 2021-10-05 VITALS
TEMPERATURE: 98.2 F | HEIGHT: 67 IN | WEIGHT: 226.8 LBS | SYSTOLIC BLOOD PRESSURE: 146 MMHG | DIASTOLIC BLOOD PRESSURE: 71 MMHG | OXYGEN SATURATION: 93 % | HEART RATE: 73 BPM | BODY MASS INDEX: 35.6 KG/M2 | RESPIRATION RATE: 20 BRPM

## 2021-10-05 PROBLEM — D89.832 CYTOKINE RELEASE SYNDROME, GRADE 2: Status: ACTIVE | Noted: 2021-10-05

## 2021-10-05 LAB
ALBUMIN SERPL-MCNC: 2.98 G/DL (ref 3.5–5.2)
ALBUMIN/GLOB SERPL: 1 G/DL
ALP SERPL-CCNC: 41 U/L (ref 39–117)
ALT SERPL W P-5'-P-CCNC: 25 U/L (ref 1–41)
ANION GAP SERPL CALCULATED.3IONS-SCNC: 8.1 MMOL/L (ref 5–15)
AST SERPL-CCNC: 31 U/L (ref 1–40)
BASOPHILS # BLD AUTO: 0 10*3/MM3 (ref 0–0.2)
BASOPHILS NFR BLD AUTO: 0 % (ref 0–1.5)
BILIRUB SERPL-MCNC: 0.2 MG/DL (ref 0–1.2)
BUN SERPL-MCNC: 19 MG/DL (ref 8–23)
BUN/CREAT SERPL: 17.4 (ref 7–25)
CALCIUM SPEC-SCNC: 8 MG/DL (ref 8.6–10.5)
CHLORIDE SERPL-SCNC: 105 MMOL/L (ref 98–107)
CK SERPL-CCNC: 209 U/L (ref 20–200)
CO2 SERPL-SCNC: 25.9 MMOL/L (ref 22–29)
CREAT SERPL-MCNC: 1.09 MG/DL (ref 0.76–1.27)
CRP SERPL-MCNC: 2.51 MG/DL (ref 0–0.5)
CYTOLOGIST CVX/VAG CYTO: NORMAL
DEPRECATED RDW RBC AUTO: 40.8 FL (ref 37–54)
EOSINOPHIL # BLD AUTO: 0.01 10*3/MM3 (ref 0–0.4)
EOSINOPHIL NFR BLD AUTO: 0.2 % (ref 0.3–6.2)
ERYTHROCYTE [DISTWIDTH] IN BLOOD BY AUTOMATED COUNT: 12.4 % (ref 12.3–15.4)
FERRITIN SERPL-MCNC: 413.2 NG/ML (ref 30–400)
GFR SERPL CREATININE-BSD FRML MDRD: 66 ML/MIN/1.73
GLOBULIN UR ELPH-MCNC: 2.9 GM/DL
GLUCOSE SERPL-MCNC: 141 MG/DL (ref 65–99)
HCT VFR BLD AUTO: 37.9 % (ref 37.5–51)
HGB BLD-MCNC: 12.6 G/DL (ref 13–17.7)
IMM GRANULOCYTES # BLD AUTO: 0.01 10*3/MM3 (ref 0–0.05)
IMM GRANULOCYTES NFR BLD AUTO: 0.2 % (ref 0–0.5)
LARGE PLATELETS: NORMAL
LYMPHOCYTES # BLD AUTO: 0.87 10*3/MM3 (ref 0.7–3.1)
LYMPHOCYTES NFR BLD AUTO: 19.1 % (ref 19.6–45.3)
MCH RBC QN AUTO: 30.1 PG (ref 26.6–33)
MCHC RBC AUTO-ENTMCNC: 33.2 G/DL (ref 31.5–35.7)
MCV RBC AUTO: 90.5 FL (ref 79–97)
MONOCYTES # BLD AUTO: 0.34 10*3/MM3 (ref 0.1–0.9)
MONOCYTES NFR BLD AUTO: 7.5 % (ref 5–12)
NEUTROPHILS NFR BLD AUTO: 3.32 10*3/MM3 (ref 1.7–7)
NEUTROPHILS NFR BLD AUTO: 73 % (ref 42.7–76)
NRBC BLD AUTO-RTO: 0 /100 WBC (ref 0–0.2)
PATH INTERP BLD-IMP: NORMAL
PLATELET # BLD AUTO: 84 10*3/MM3 (ref 140–450)
PMV BLD AUTO: 12 FL (ref 6–12)
POTASSIUM SERPL-SCNC: 4 MMOL/L (ref 3.5–5.2)
PROT SERPL-MCNC: 5.9 G/DL (ref 6–8.5)
RBC # BLD AUTO: 4.19 10*6/MM3 (ref 4.14–5.8)
RBC MORPH BLD: NORMAL
SODIUM SERPL-SCNC: 139 MMOL/L (ref 136–145)
WBC # BLD AUTO: 4.55 10*3/MM3 (ref 3.4–10.8)

## 2021-10-05 PROCEDURE — 85025 COMPLETE CBC W/AUTO DIFF WBC: CPT | Performed by: HOSPITALIST

## 2021-10-05 PROCEDURE — 99239 HOSP IP/OBS DSCHRG MGMT >30: CPT | Performed by: INTERNAL MEDICINE

## 2021-10-05 PROCEDURE — 82728 ASSAY OF FERRITIN: CPT | Performed by: HOSPITALIST

## 2021-10-05 PROCEDURE — 25010000002 DEXAMETHASONE PER 1 MG: Performed by: HOSPITALIST

## 2021-10-05 PROCEDURE — 85007 BL SMEAR W/DIFF WBC COUNT: CPT | Performed by: HOSPITALIST

## 2021-10-05 PROCEDURE — 82550 ASSAY OF CK (CPK): CPT | Performed by: HOSPITALIST

## 2021-10-05 PROCEDURE — 86140 C-REACTIVE PROTEIN: CPT | Performed by: HOSPITALIST

## 2021-10-05 PROCEDURE — 80053 COMPREHEN METABOLIC PANEL: CPT | Performed by: HOSPITALIST

## 2021-10-05 RX ADMIN — REMDESIVIR 100 MG: 100 INJECTION, POWDER, LYOPHILIZED, FOR SOLUTION INTRAVENOUS at 09:18

## 2021-10-05 RX ADMIN — GUAIFENESIN 1200 MG: 600 TABLET, EXTENDED RELEASE ORAL at 09:17

## 2021-10-05 RX ADMIN — Medication 220 MG: at 09:17

## 2021-10-05 RX ADMIN — CHOLECALCIFEROL TAB 10 MCG (400 UNIT) 1200 UNITS: 10 TAB at 09:17

## 2021-10-05 RX ADMIN — PANTOPRAZOLE SODIUM 40 MG: 40 TABLET, DELAYED RELEASE ORAL at 09:18

## 2021-10-05 RX ADMIN — SODIUM CHLORIDE, PRESERVATIVE FREE 10 ML: 5 INJECTION INTRAVENOUS at 09:18

## 2021-10-05 RX ADMIN — TERAZOSIN HYDROCHLORIDE 5 MG: 5 CAPSULE ORAL at 09:18

## 2021-10-05 RX ADMIN — DEXAMETHASONE SODIUM PHOSPHATE 6 MG: 4 INJECTION, SOLUTION INTRA-ARTICULAR; INTRALESIONAL; INTRAMUSCULAR; INTRAVENOUS; SOFT TISSUE at 09:16

## 2021-10-05 NOTE — PLAN OF CARE
Goal Outcome Evaluation:           Progress: no change  Outcome Summary: Pt rested well tonight. Pt reports feeling better. VS stable. O2 stable on room air. continuest to have bilat wheezing on expiration. blood glucose levels stable.

## 2021-10-05 NOTE — DISCHARGE INSTRUCTIONS
Please take medications as prescribed. Please go to follow-up appointments as recommended. Please seek medical attention if you have shortness of breath or oxygen levels below 88%.     Please isolate at home through 10/11 unless otherwise recommended by health department.     Please continue social distancing and isolation efforts as recommended by CDC and [facility] to help prevent spread of COVID-19.

## 2021-10-05 NOTE — DISCHARGE SUMMARY
[facility] HOSPITALISTS DISCHARGE SUMMARY    Patient Identification:  Name:  Richardson Odom  Age:  75 y.o.  Sex:  male  :  1945  MRN:  [id number]  Visit Number:  [id number]    Date of Admission: 10/3/2021  Date of Discharge:  10/5/2021    PCP: Kreis, Samuel Duane, MD    DISCHARGE DIAGNOSIS  #Sepsis and acute hypoxic respiratory failure 2/2 COVID-19 pneumonia  #Acute on chronic thrombocytopenia  #Mild ORALIA vs. CKD  #PreDM  #HTN    CONSULTS   Infectious disease     PROCEDURES PERFORMED  None    HOSPITAL COURSE  Patient is a 75 y.o. male presented on 10/3 to [facility] complaining of hypoxia at home.  Please see the admitting history and physical for further details.      Mr. Odom is our 76 yo M with hx of HtN who presented to the ER with recently diagnosed COVID who presented with hypoxia from home. Patient had received 1/2 pfizer vaccine and received regen-cov on 10/2. Patient started on remdesivir and dexamethasone and improved. Patient with acute on chronic thrombocytopenia with platelets stable in the 80's, etiology unclear, possibly from covid. On day of discharge patient reported feeling well with no complaints. Has been saturating well on room air. No bleeding. Patient reported being comfortable going home. Patient to f/u with PCP in 1 week. Recommend CBC at that time and heme/onc referral if thrombocytopenia worsens.     VITAL SIGNS:        on   ;        Body mass index is 35.52 kg/m².  Wt Readings from Last 3 Encounters:   10/05/21 103 kg (226 lb 12.8 oz)   10/02/21 109 kg (240 lb)       PHYSICAL EXAM:  Constitutional:  Well-developed and well-nourished.  No respiratory distress.      HENT:  Head:  Normocephalic and atraumatic.  Mouth:  Moist mucous membranes.    Eyes:  Conjunctivae and EOM are normal.  Pupils are equal, round, and reactive to light.  No scleral icterus.    Cardiovascular:  Normal rate, regular rhythm and normal heart sounds with no  murmur.  Pulmonary/Chest:  No respiratory distress, no wheezes, no crackles, with normal breath sounds and good air movement.  Abdominal:  Soft.  Bowel sounds are normal.  No distension and no tenderness.   Musculoskeletal:  No edema, no tenderness, and no deformity.  No red or swollen joints anywhere.    Neurological:  Alert and oriented to person, place, and time.  No gross neurological deficit.   Skin:  Skin is warm and dry. No rash noted. No pallor.   Peripheral vascular:  Strong pulses in all 4 extremities with no clubbing, no cyanosis, no edema.    DISCHARGE DISPOSITION   Stable    DISCHARGE MEDICATIONS:     Discharge Medications      Continue These Medications      Instructions Start Date   amLODIPine 10 MG tablet  Commonly known as: NORVASC   10 mg, Oral, Nightly      cholecalciferol 25 MCG (1000 UT) tablet  Commonly known as: VITAMIN D3   1,000 Units, Oral, Daily      doxazosin 8 MG tablet  Commonly known as: CARDURA   8 mg, Oral, Every Morning      guaiFENesin 600 MG 12 hr tablet  Commonly known as: MUCINEX   1,200 mg, Oral, 2 Times Daily      pantoprazole 40 MG EC tablet  Commonly known as: PROTONIX   40 mg, Oral, Daily      Zinc 220 (50 Zn) MG capsule   1 capsule, Oral, Daily                Follow-up Information     Provider, No Known Follow up.    Contact information:  [facility] [id number] [phone]             Kreis, Samuel Duane, MD Follow up.    Specialty: Family Medicine  Why: Recommend CBC at next visit to monitor thrombocytopenia and heme/onc referral if it continues to worsen.   Contact information:  [address] [place] [address] [address] [place] [address] [phone]                          TEST  RESULTS PENDING AT DISCHARGE       CODE STATUS  Code Status and Medical Interventions:   Ordered at: 10/03/21 0645     Level Of Support Discussed With:    Patient     Code Status:    CPR     Medical Interventions (Level of Support Prior to Arrest):    Full       Parag Villeda MD  [facility] [place]  [facility]  10/10/21  21:08 EDT    Please note that this discharge summary required more than 30 minutes to complete.

## 2021-10-05 NOTE — PLAN OF CARE
Goal Outcome Evaluation:              Outcome Summary: Patient resting in bed, alert and oriented. No complaints, will monitor.

## 2021-10-05 NOTE — CASE MANAGEMENT/SOCIAL WORK
Discharge Planning Assessment   [facility]     Patient Name: Richardson Odom  MRN: [id number]  Today's Date: 10/5/2021    Admit Date: 10/3/2021      Discharge Plan     Row Name 10/05/21 1721       Plan    Final Discharge Disposition Code  01 - home or self-care    Final Note Pt is being discharged home today. No needs identified.        RYAN Jules

## 2021-10-06 ENCOUNTER — READMISSION MANAGEMENT (OUTPATIENT)
Dept: CALL CENTER | Facility: HOSPITAL | Age: 76
End: 2021-10-06

## 2021-10-06 NOTE — OUTREACH NOTE
COVID-19 Week 1 Survey      Responses   [facility] patient discharged from?  [facility]   Does the patient have one of the following disease processes/diagnoses(primary or secondary)?  COVID-19   COVID-19 underlying condition?  Sepsis   Call Number  Call 1   Week 1 Call successful?  Yes   Call start time  1226   Call end time  1242   Discharge diagnosis  Sepsis and acute hypoxic respiratory failure 2/2 COVID-19 pneumonia   Is patient permission given to speak with other caregiver?  Yes   List who call center can speak with  Reba   Person spoke with today (if not patient) and relationship  Reba, wife   Meds reviewed with patient/caregiver?  Yes   Is the patient having any side effects they believe may be caused by any medication additions or changes?  No   Does the patient have all medications ordered at discharge?  Yes   Is the patient taking all medications as directed (includes completed medication regime)?  Yes   Medication comments  Wife says he had antibody infusion on Saturday.  Wife reports he had Remdesivir while in the hospital   Does the patient have a primary care provider?   Yes   Does the patient have an appointment with their PCP or specialist within 7 days of discharge?  No   What is preventing the patient from scheduling follow up appointments within 7 days of discharge?  Haven't had time   Nursing Interventions  Educated patient on importance of making appointment   Has the patient kept scheduled appointments due by today?  N/A   Comments  Advised to call for appt   Has home health visited the patient within 72 hours of discharge?  N/A   Psychosocial issues?  No   Comments  Entire household has COVID   Did the patient receive a copy of their discharge instructions?  Yes   Did the patient receive a copy of COVID-19 specific instructions?  Yes   Nursing interventions  Reviewed instructions with patient   What is the patient's perception of their health status since discharge?  Improving    Does the patient have any of the following symptoms?  None   Nursing Interventions  Nurse provided patient education   Pulse Ox monitoring  Intermittent   Pulse Ox device source  Patient   O2 Sat comments  O2 sat 97% RA   O2 Sat: education provided  Sat levels, Monitoring frequency, When to seek care   Is the patient/caregiver able to teach back steps to recovery at home?  Rest and rebuild strength, gradually increase activity, Eat a well-balance diet, Set small, achievable goals for return to baseline health, Make a list of questions for provider's appointment, Practice good oral hygiene   Is the patient/caregiver able to teach back the hierarchy of who to call/visit for symptoms/problems? PCP, Specialist, Home health nurse, Urgent Care, ED, 911  Yes   Nursing interventions  Nurse provided patient education   Is patient/caregiver able to teach back steps to recovery at home?  Set small, achievable goals for return to baseline health, Rest and regain strength   Is the patient/caregiver able to teach back signs and symptoms of worsening condition:  Fever, Rapid heart rate (>90), Shortness of breath/rapid respiratory rate, Altered mental status(confusion/coma), Hyperthermia   COVID-19 call completed?  Yes          Zoraida Aguilar RN

## 2021-10-06 NOTE — DISCHARGE INSTR - APPOINTMENTS
Follow-up with PCP, Dr Mack on 10/19/21 at 10:30 am. [facility]

## 2021-10-06 NOTE — OUTREACH NOTE
Prep Survey      Responses   [facility] patient discharged from?  [facility]   Is LACE score < 7 ?  Yes   Emergency Room discharge w/ pulse ox?  No   Eligibility  Readm Mgmt   Discharge diagnosis  Sepsis and acute hypoxic respiratory failure 2/2 COVID-19 pneumonia   Does the patient have one of the following disease processes/diagnoses(primary or secondary)?  COVID-19   Does the patient have Home health ordered?  No   Is there a DME ordered?  No   Prep survey completed?  Yes          Jovita Hermosillo RN

## 2021-10-07 ENCOUNTER — READMISSION MANAGEMENT (OUTPATIENT)
Dept: CALL CENTER | Facility: HOSPITAL | Age: 76
End: 2021-10-07

## 2021-10-07 NOTE — OUTREACH NOTE
COVID-19 Week 1 Survey      Responses   [facility] patient discharged from?  [facility]   Does the patient have one of the following disease processes/diagnoses(primary or secondary)?  COVID-19   COVID-19 underlying condition?  Sepsis   Call Number  Call 2   Week 1 Call successful?  Yes   Call start time  1440   Call end time  1446   General alerts for this patient  Per wife pt wont answer his phone and wants all communication to be with her.    Discharge diagnosis  Sepsis and acute hypoxic respiratory failure 2/2 COVID-19 pneumonia   Person spoke with today (if not patient) and relationship  Reba, wife   Meds reviewed with patient/caregiver?  Yes   Is the patient having any side effects they believe may be caused by any medication additions or changes?  No   Is the patient taking all medications as directed (includes completed medication regime)?  Yes   Comments regarding appointments  F/U with PCP 10/19/21   Does the patient have a primary care provider?   Yes   Does the patient have an appointment with their PCP or specialist within 7 days of discharge?  No   What is preventing the patient from scheduling follow up appointments within 7 days of discharge?  Earlier appointment not available   Nursing Interventions  Verified appointment date/time/provider   Has the patient kept scheduled appointments due by today?  N/A   Has home health visited the patient within 72 hours of discharge?  N/A   Psychosocial issues?  No   Nursing interventions  Reviewed instructions with patient   What is the patient's perception of their health status since discharge?  Improving   Does the patient have any of the following symptoms?  None   Nursing Interventions  Nurse provided patient education   Pulse Ox monitoring  Intermittent   O2 Sat comments  93-97% on RA   O2 Sat: education provided  Sat levels, Monitoring frequency, When to seek care   O2 Sat education comments  Advised to seek emergent medical care if O2 drops under  90% and does not rebound with OX and deep breaths.   Is the patient/caregiver able to teach back steps to recovery at home?  Eat a well-balance diet, Rest and rebuild strength, gradually increase activity   If the patient is a current smoker, are they able to teach back resources for cessation?  Not a smoker   Nursing interventions  Nurse provided reassurance to patient   Is patient/caregiver able to teach back steps to recovery at home?  Set small, achievable goals for return to baseline health, Rest and regain strength   COVID-19 call completed?  Yes   Wrap up additional comments  Pt feeling much better and will keep f/u appts.          Erin Beaulieu RN

## 2021-10-08 ENCOUNTER — READMISSION MANAGEMENT (OUTPATIENT)
Dept: CALL CENTER | Facility: HOSPITAL | Age: 76
End: 2021-10-08

## 2021-10-08 LAB
BACTERIA SPEC AEROBE CULT: NORMAL
BACTERIA SPEC AEROBE CULT: NORMAL

## 2021-10-08 NOTE — OUTREACH NOTE
COVID-19 Week 1 Survey      Responses   [facility] patient discharged from?  [facility]   Does the patient have one of the following disease processes/diagnoses(primary or secondary)?  COVID-19   COVID-19 underlying condition?  Sepsis   Call Number  Call 3   Week 1 Call successful?  Yes   Call start time  1226   Call end time  1234   General alerts for this patient  Per wife pt wont answer his phone and wants all communication to be with her.    Discharge diagnosis  Sepsis and acute hypoxic respiratory failure 2/2 COVID-19 pneumonia   Person spoke with today (if not patient) and relationship  keith Britton   Meds reviewed with patient/caregiver?  Yes   Is the patient having any side effects they believe may be caused by any medication additions or changes?  No   Does the patient have all medications ordered at discharge?  Yes   Is the patient taking all medications as directed (includes completed medication regime)?  Yes   Comments regarding appointments  F/U with PCP 10/19/21   Does the patient have a primary care provider?   Yes   Does the patient have an appointment with their PCP or specialist within 7 days of discharge?  Greater than 7 days   What is preventing the patient from scheduling follow up appointments within 7 days of discharge?  Earlier appointment not available   Nursing Interventions  Verified appointment date/time/provider   Has the patient kept scheduled appointments due by today?  N/A   Psychosocial issues?  No   What is the patient's perception of their health status since discharge?  Improving [Wife states patient has been walking more]   Does the patient have any of the following symptoms?  -- [Wife states patient has developed rash and itching on his back. R.N. instructed patient to contact Dr. Mack-PCP and report this.]   Nursing Interventions  Nurse provided patient education   Pulse Ox monitoring  Intermittent   Pulse Ox device source  Patient   O2 Sat comments  O2 sat 93% on RA   O2  Sat: education provided  Sat levels, Monitoring frequency   Is the patient/caregiver able to teach back steps to recovery at home?  Set small, achievable goals for return to baseline health, Eat a well-balance diet, Rest and rebuild strength, gradually increase activity   If the patient is a current smoker, are they able to teach back resources for cessation?  Not a smoker   Is the patient/caregiver able to teach back the hierarchy of who to call/visit for symptoms/problems? PCP, Specialist, Home health nurse, Urgent Care, ED, 911  Yes   Is the patient/caregiver able to teach back Sepsis?  S - Short of breath, S - Shivering,fever or very cold, E - Extreme pain or generalized discomfort (worst ever,especially abdomen)   Nursing interventions  Nurse provided patient education   Is the patient/caregiver able to teach back signs and symptoms of worsening condition:  Fever, Shortness of breath/rapid respiratory rate   COVID-19 call completed?  Yes          Kami Shabazz RN

## 2021-10-12 ENCOUNTER — READMISSION MANAGEMENT (OUTPATIENT)
Dept: CALL CENTER | Facility: HOSPITAL | Age: 76
End: 2021-10-12

## 2021-10-12 NOTE — OUTREACH NOTE
COVID-19 Week 2 Survey      Responses   [facility] patient discharged from? [facility]   Does the patient have one of the following disease processes/diagnoses(primary or secondary)? COVID-19   COVID-19 underlying condition? Sepsis   Call Number Call 1   COVID-19 Week 2: Call 1 attempt successful? Yes   Call start time [phone]   Call end time [phone]   General alerts for this patient Per wife pt wont answer his phone and wants all communication to be with her.    Discharge diagnosis Sepsis and acute hypoxic respiratory failure 2/2 COVID-19 pneumonia   Person spoke with today (if not patient) and relationship Reba, wife   Meds reviewed with patient/caregiver? Yes   Comments regarding appointments F/U with PCP 10/19/21   Psychosocial issues? No   What is the patient's perception of their health status since discharge? Improving   Does the patient have any of the following symptoms? Cough  [still has rash on back, quick muccinex rash is improving. Fatigue remains.]   Nursing Interventions Nurse provided patient education   Pulse Ox monitoring Intermittent   Pulse Ox device source Patient   O2 Sat comments [id number]%   Is the patient/caregiver able to teach back steps to recovery at home? Set small, achievable goals for return to baseline health,  Rest and rebuild strength, gradually increase activity   If the patient is a current smoker, are they able to teach back resources for cessation? Not a smoker   Is the patient/caregiver able to teach back the hierarchy of who to call/visit for symptoms/problems? PCP, Specialist, Home health nurse, Urgent Care, ED, 911 Yes   Is the patient/caregiver able to teach back Sepsis? E - Extreme pain or generalized discomfort (worst ever,especially abdomen),  S - Shivering,fever or very cold   Nursing interventions Nurse provided reassurance to patient   Is patient/caregiver able to teach back steps to recovery at home? Set small, achievable goals for return to baseline health,  Rest and  regain strength   Is the patient/caregiver able to teach back signs and symptoms of worsening condition: Fever,  Hyperthermia   COVID-19 call completed? Yes          Lauryn Trujillo RN

## 2024-10-22 ENCOUNTER — OFFICE VISIT (OUTPATIENT)
Dept: UROLOGY | Facility: CLINIC | Age: 79
End: 2024-10-22
Payer: MEDICARE

## 2024-10-22 VITALS
SYSTOLIC BLOOD PRESSURE: 104 MMHG | HEIGHT: 67 IN | WEIGHT: 220 LBS | BODY MASS INDEX: 34.53 KG/M2 | DIASTOLIC BLOOD PRESSURE: 66 MMHG | HEART RATE: 66 BPM

## 2024-10-22 DIAGNOSIS — N40.0 ENLARGED PROSTATE: Primary | ICD-10-CM

## 2024-10-22 PROCEDURE — 99203 OFFICE O/P NEW LOW 30 MIN: CPT | Performed by: UROLOGY

## 2024-10-22 PROCEDURE — 1159F MED LIST DOCD IN RCRD: CPT | Performed by: UROLOGY

## 2024-10-22 PROCEDURE — 1160F RVW MEDS BY RX/DR IN RCRD: CPT | Performed by: UROLOGY

## 2024-10-22 PROCEDURE — 36415 COLL VENOUS BLD VENIPUNCTURE: CPT | Performed by: UROLOGY

## 2024-10-22 RX ORDER — VALSARTAN 160 MG/1
160 TABLET ORAL DAILY
COMMUNITY

## 2024-10-22 NOTE — PROGRESS NOTES
"Chief Complaint:      Chief Complaint   Patient presents with    Benign Prostatic Hypertrophy       HPI:   78 y.o. male new patient referred by Dr. Bui who at the time of his colonoscopy identified a \"big prostate\" he has never had a PSA.  He has had COVID.  He reports no lower urinary tract symptomatology, particularly irritative symptoms such as frequency, urgency, dysuria, and obstructive symptomatology, particularly dribbling, hesitancy, and intermittency.  On exam he had a very large palpable prostate.  As long as his PSA stable he gave him reassurance and I will see him back on an as-needed basis.    Past Medical History:     Past Medical History:   Diagnosis Date    Hypertension        Current Meds:     Current Outpatient Medications   Medication Sig Dispense Refill    amLODIPine (NORVASC) 10 MG tablet Take 1 tablet by mouth Every Night.      cholecalciferol (VITAMIN D3) 25 MCG (1000 UT) tablet Take 1 tablet by mouth Daily.      doxazosin (CARDURA) 8 MG tablet Take 1 tablet by mouth Every Morning.      pantoprazole (PROTONIX) 40 MG EC tablet Take 1 tablet by mouth Daily.      valsartan (DIOVAN) 160 MG tablet Take 1 tablet by mouth Daily. for blood pressure      Zinc 220 (50 Zn) MG capsule Take 1 capsule by mouth Daily.      guaiFENesin (MUCINEX) 600 MG 12 hr tablet Take 2 tablets by mouth 2 (Two) Times a Day. (Patient not taking: Reported on 10/22/2024)       No current facility-administered medications for this visit.        Allergies:      Allergies   Allergen Reactions    Ciprofibrate Hives    Sulfa Antibiotics Hives        Past Surgical History:     Past Surgical History:   Procedure Laterality Date    APPENDECTOMY         Social History:     Social History     Socioeconomic History    Marital status:    Tobacco Use    Smoking status: Never    Smokeless tobacco: Never   Vaping Use    Vaping status: Never Used   Substance and Sexual Activity    Alcohol use: Never    Drug use: Never    Sexual " activity: Defer       Family History:     Family History   Problem Relation Age of Onset    Stroke Father        Review of Systems:     Review of Systems   Constitutional: Negative.    HENT: Negative.     Eyes: Negative.    Respiratory: Negative.     Cardiovascular: Negative.    Gastrointestinal: Negative.    Endocrine: Negative.    Musculoskeletal: Negative.    Allergic/Immunologic: Negative.    Neurological: Negative.    Hematological: Negative.    Psychiatric/Behavioral: Negative.         Physical Exam:     Physical Exam  Vitals and nursing note reviewed.   Constitutional:       Appearance: He is well-developed.   HENT:      Head: Normocephalic and atraumatic.   Eyes:      Conjunctiva/sclera: Conjunctivae normal.      Pupils: Pupils are equal, round, and reactive to light.   Cardiovascular:      Rate and Rhythm: Normal rate and regular rhythm.      Heart sounds: Normal heart sounds.   Pulmonary:      Effort: Pulmonary effort is normal.      Breath sounds: Normal breath sounds.   Abdominal:      General: Bowel sounds are normal.      Palpations: Abdomen is soft.   Genitourinary:     Comments: Good rectal tone very large prostate to palpation greater than 60 g  Musculoskeletal:         General: Normal range of motion.      Cervical back: Normal range of motion.   Skin:     General: Skin is warm and dry.   Neurological:      Mental Status: He is alert and oriented to person, place, and time.      Deep Tendon Reflexes: Reflexes are normal and symmetric.   Psychiatric:         Behavior: Behavior normal.         Thought Content: Thought content normal.         Judgment: Judgment normal.         I have reviewed the following portions of the patient's history: Allergies, current medications, past family history, past medical history, past social history, past surgical history, problem list, and ROS and confirm it is accurate.    Recent Image (CT and/or KUB):      CT Abdomen and Pelvis: No results found for this or any  previous visit.       CT Stone Protocol: No results found for this or any previous visit.       KUB: No results found for this or any previous visit.       Labs (past 3 months):      No visits with results within 3 Month(s) from this visit.   Latest known visit with results is:   No results displayed because visit has over 200 results.           Procedure:       Assessment/Plan:   BPH: Discussed the pathophysiology of BPH and obstruction.  We discussed the static and dynamic effects of BPH as well as using 5 alpha reductase inhibitors versus alpha blockade.  We discussed the indications for transurethral surgery as well and/ or other therapeutic options available including all of the newer techniques.  Currently asymptomatic  PSA testing-I am recommending a PSA blood test that stands for prostate specific antigen.  I discussed the pathophysiology of PSA testing indicating its use in the diagnosis and management of prostate cancer.  I discussed the normal range being 0 to 4, but more appropriately being much closer to 0 to 2 in a normal male.  I discussed the fact that after a certain age we don't recommend PSA testing especially in view of numerous comorbidities, that this will not be a useful test.  I discussed many of the things that can artificially raise PSA including a recent infection, urinary tract infection, and recent sexual intercourse, or even the type of movement such as manipulation of the prostate from riding a bicycle.  After all this is taken into account when the test is reviewed, the most important use of PSA is the velocity measurement.  In other words, the change of PSA with time is a very important factor in the use and that we look for greater than 20% rise over a year to help us make the prediction of prostate cancer.  I also discussed that the use with prostate cancer indicating that after a radical prostatectomy, the PSA should be 0 and any rise indicates an early biochemical  recurrence.    Patient reports that he is not currently experiencing any symptoms of urinary incontinence.      BMI cannot be calculated due to outdated height or weight values.  Please input a current height/weight in Vitals and re-renter [id number] in Note to pull in correct documentation based on BMI range.              This document has been electronically signed by ARJUN BRAMBILA MD October 22, 2024 13:17 EDT    Dictated Utilizing Dragon Dictation: Part of this note may be an electronic transcription/translation of spoken language to printed text using the Dragon Dictation System.

## 2024-10-23 LAB — PSA SERPL-MCNC: 1.89 NG/ML (ref 0–4)

## 2024-11-03 PROBLEM — N40.0 ENLARGED PROSTATE: Status: ACTIVE | Noted: 2024-11-03
